# Patient Record
Sex: MALE | Race: WHITE | NOT HISPANIC OR LATINO | Employment: OTHER | URBAN - METROPOLITAN AREA
[De-identification: names, ages, dates, MRNs, and addresses within clinical notes are randomized per-mention and may not be internally consistent; named-entity substitution may affect disease eponyms.]

---

## 2017-04-05 ENCOUNTER — OFFICE VISIT (OUTPATIENT)
Dept: FAMILY MEDICINE | Facility: CLINIC | Age: 52
End: 2017-04-05

## 2017-04-05 VITALS
HEIGHT: 69 IN | SYSTOLIC BLOOD PRESSURE: 122 MMHG | DIASTOLIC BLOOD PRESSURE: 81 MMHG | BODY MASS INDEX: 22.08 KG/M2 | HEART RATE: 79 BPM | WEIGHT: 149.08 LBS | OXYGEN SATURATION: 100 %

## 2017-04-05 DIAGNOSIS — Z00.00 PREVENTATIVE HEALTH CARE: Primary | ICD-10-CM

## 2017-04-05 ASSESSMENT — PAIN SCALES - GENERAL: PAINLEVEL: NO PAIN (0)

## 2017-04-05 NOTE — NURSING NOTE
"51 year old  Chief Complaint   Patient presents with     Surgical Followup     Hernia repair about a year ago.     Skin Spots     to check on his abdomen.       Blood pressure 122/81, pulse 79, height 5' 9.49\" (176.5 cm), weight 149 lb 1.3 oz (67.6 kg), SpO2 100 %. Body mass index is 21.71 kg/(m^2).  Patient Active Problem List   Diagnosis     Basal cell carcinoma of skin     Preventative health care     Pap smear of anus with ASCUS     Bilateral recurrent inguinal hernia without obstruction or gangrene       Wt Readings from Last 2 Encounters:   04/05/17 149 lb 1.3 oz (67.6 kg)   06/20/16 155 lb (70.3 kg)     BP Readings from Last 3 Encounters:   04/05/17 122/81   06/22/16 110/73   06/20/16 126/82         Current Outpatient Prescriptions   Medication     COD LIVER OIL PO     Multiple Vitamin (DAILY MULTIVITAMIN PO)     VITAMIN E PO     No current facility-administered medications for this visit.        Social History   Substance Use Topics     Smoking status: Never Smoker     Smokeless tobacco: Never Used     Alcohol use Yes      Comment: rare social wine       There are no preventive care reminders to display for this patient.    No results found for: ARTEMIO Norman CMA  April 5, 2017 10:30 AM  "

## 2017-04-05 NOTE — MR AVS SNAPSHOT
"              After Visit Summary   4/5/2017    Jaun Reilly    MRN: [id number]           Patient Information     Date Of Birth          1965        Visit Information        Provider Department      4/5/2017 10:20 AM Oj Mathis MD [facility]         Follow-ups after your visit        Who to contact     Please call your clinic at [phone] to:    Ask questions about your health    Make or cancel appointments    Discuss your medicines    Learn about your test results    Speak to your doctor   If you have compliments or concerns about an experience at your clinic, or if you wish to file a complaint, please contact [facility] Patient Relations at [phone] or email us at [email or web address]         Additional Information About Your Visit        MyChart Information     [employer] gives you secure access to your electronic health record. If you see a primary care provider, you can also send messages to your care team and make appointments. If you have questions, please call your primary care clinic.  If you do not have a primary care provider, please call [phone] and they will assist you.      [employer] is an electronic gateway that provides easy, online access to your medical records. With [employer], you can request a clinic appointment, read your test results, renew a prescription or communicate with your care team.     To access your existing account, please contact your [facility] or call [phone] for assistance.        Care EveryWhere ID     This is your Care EveryWhere ID. This could be used by other organizations to access your [place] medical records  [id number]        Your Vitals Were     Pulse Height Pulse Oximetry BMI (Body Mass Index)          79 5' 9.49\" (176.5 cm) 100% 21.71 kg/m2         Blood Pressure from Last 3 Encounters:   04/05/17 122/81   06/22/16 110/73   06/20/16 126/82    Weight from Last 3 " Encounters:   04/05/17 149 lb 1.3 oz (67.6 kg)   06/20/16 155 lb (70.3 kg)   06/06/16 150 lb 8 oz (68.3 kg)              Today, you had the following     No orders found for display       Primary Care Provider Office Phone # Fax #    Oj Mathis [phone]       [place] [address]        Thank you!     [facility]. Our goal is always to provide you with excellent care. Hearing back from our patients is one way we can continue to improve our services. Please take a few minutes to complete the written survey that you may receive in the mail after your visit with us. Thank you!             Your Updated Medication List - Protect others around you: Learn how to safely use, store and throw away your medicines at www.disposemymeds.org.          This list is accurate as of: 4/5/17 10:56 AM.  Always use your most recent med list.                   Brand Name Dispense Instructions for use    COD LIVER OIL PO      Take 1 tablet by mouth daily.       DAILY MULTIVITAMIN PO      Take 1 tablet by mouth daily.       VITAMIN E PO      Take 1-2 tablets by mouth daily.

## 2017-07-14 ENCOUNTER — TRANSFERRED RECORDS (OUTPATIENT)
Dept: HEALTH INFORMATION MANAGEMENT | Facility: CLINIC | Age: 52
End: 2017-07-14

## 2018-04-16 ENCOUNTER — HOSPITAL ENCOUNTER (OUTPATIENT)
Facility: CLINIC | Age: 53
End: 2018-04-16
Attending: SPECIALIST | Admitting: SPECIALIST

## 2018-11-29 ENCOUNTER — OFFICE VISIT (OUTPATIENT)
Dept: OPHTHALMOLOGY | Facility: CLINIC | Age: 53
End: 2018-11-29
Attending: OPHTHALMOLOGY
Payer: COMMERCIAL

## 2018-11-29 DIAGNOSIS — H40.033 ANATOMICAL NARROW ANGLE BORDERLINE GLAUCOMA OF BOTH EYES: Primary | ICD-10-CM

## 2018-11-29 DIAGNOSIS — H53.10 SUBJECTIVE VISUAL DISTURBANCE: Primary | ICD-10-CM

## 2018-11-29 DIAGNOSIS — H53.10 SUBJECTIVE VISUAL DISTURBANCE: ICD-10-CM

## 2018-11-29 PROCEDURE — G0463 HOSPITAL OUTPT CLINIC VISIT: HCPCS | Mod: ZF

## 2018-11-29 PROCEDURE — 40000269 ZZH STATISTIC NO CHARGE FACILITY FEE: Mod: ZF

## 2018-11-29 PROCEDURE — 92083 EXTENDED VISUAL FIELD XM: CPT | Mod: ZF | Performed by: OPHTHALMOLOGY

## 2018-11-29 PROCEDURE — 92133 CPTRZD OPH DX IMG PST SGM ON: CPT | Mod: ZF | Performed by: OPHTHALMOLOGY

## 2018-11-29 PROCEDURE — 92020 GONIOSCOPY: CPT | Mod: ZF | Performed by: OPHTHALMOLOGY

## 2018-11-29 RX ORDER — BUPROPION HYDROCHLORIDE 150 MG/1
TABLET ORAL
COMMUNITY
Start: 2018-03-27 | End: 2020-10-21

## 2018-11-29 ASSESSMENT — SLIT LAMP EXAM - LIDS
COMMENTS: NORMAL

## 2018-11-29 ASSESSMENT — VISUAL ACUITY
OS_CC: 20/20
METHOD: SNELLEN - LINEAR
OS_CC: 20/20
CORRECTION_TYPE: GLASSES
CORRECTION_TYPE: GLASSES
OS_CC+: -2
OD_CC: 20/20
OS_CC+: -2
OD_CC: 20/20
METHOD: SNELLEN - LINEAR

## 2018-11-29 ASSESSMENT — REFRACTION_WEARINGRX
OD_AXIS: 133
OS_SPHERE: +3.50
OD_SPHERE: +2.25
OD_CYLINDER: +0.50
OS_CYLINDER: +0.75
OD_ADD: +2.00
OD_CYLINDER: +0.50
OS_SPHERE: +3.50
OS_CYLINDER: +0.75
OD_SPHERE: +2.25
OS_AXIS: 006
OS_ADD: +2.00
OS_ADD: +2.00
OS_AXIS: 006
OD_ADD: +2.00
OD_AXIS: 133

## 2018-11-29 ASSESSMENT — GONIOSCOPY
OS_INFERIOR: BARE TM
OS_TEMPORAL: APPOSITIONAL
OD_TEMPORAL: APPOSITIONAL
OD_INFERIOR: BARE TM
OS_SUPERIOR: APPOSITIONAL
OD_SUPERIOR: APPOSITIONAL
OD_NASAL: APPOSITIONAL
OS_NASAL: APPOSITIONAL

## 2018-11-29 ASSESSMENT — CUP TO DISC RATIO
OS_RATIO: 0.35
OD_RATIO: 0.35

## 2018-11-29 ASSESSMENT — TONOMETRY
IOP_METHOD: TONOPEN
OD_IOP_MMHG: 17
OS_IOP_MMHG: 18
OS_IOP_MMHG: 18
IOP_METHOD: ICARE
OD_IOP_MMHG: 17

## 2018-11-29 ASSESSMENT — EXTERNAL EXAM - LEFT EYE
OS_EXAM: NORMAL
OS_EXAM: NORMAL

## 2018-11-29 ASSESSMENT — EXTERNAL EXAM - RIGHT EYE
OD_EXAM: NORMAL
OD_EXAM: NORMAL

## 2018-11-29 ASSESSMENT — CONF VISUAL FIELD
OD_NORMAL: 1
OD_NORMAL: 1
OS_NORMAL: 1
OS_NORMAL: 1

## 2018-11-29 NOTE — LETTER
11/29/2018       RE: Jaun Reilly  [address]   [address]  [place] [facility] [address]     Dear Colleague,    Thank you for referring your patient, Jaun Reilly, to the EYE CLINIC at [facility]. Please see a copy of my visit note below.    Assessment & Plan      Jaun Reilly is a 53 year old male with the following diagnoses:   1. Anatomical narrow angle borderline glaucoma of both eyes         Reports episodes of foggy vision, halos, headache and nausea.  Likely intermittent angle closure  270 degrees of appositional closure in both eyes.  Unable to open with dynamic gonio  Intraocular pressure within normal limits today both eyes and optic nerves appear healthy    Discussed risk of acute angle closure glaucoma and recommend laser peripheral iridotomy (LPI) both eyes   RBA to procedure reviewed, he will return next week for treatment  Precautions reviewed  Will obtain baseline pachmetry and dilated fundus exam following lasers        Patient disposition:   Return in about 1 week (around 12/6/2018) for VT only, RIGHT EYE YAG PI.          Attending Physician Attestation:  Complete documentation of historical and exam elements from today's encounter can be found in the full encounter summary report (not reduplicated in this progress note).  I personally obtained the chief complaint(s) and history of present illness.  I confirmed and edited as necessary the review of systems, past medical/surgical history, family history, social history, and examination findings as documented by others; and I examined the patient myself.  I personally reviewed the relevant tests, images, and reports as documented above.  I formulated and edited as necessary the assessment and plan and discussed the findings and management plan with the patient and family. . - Job Pinedo MD       Again, thank you for allowing me to participate in the care of your patient.      Sincerely,    Job  Colby Pinedo MD

## 2018-11-29 NOTE — NURSING NOTE
"Chief Complaints and History of Present Illnesses   Patient presents with     Consult For     POAG     HPI    Last Eye Exam:  11/29/18   Symptoms:              Comments:  Return for laser iridotomy with Dr. Pinedo .   Jaun Reilly is a 53 year old male who presents today for   1. Concussion, with loss of consciousness of unspecified duration, initial encounter   2. Subjective visual disturbance     Cloudy vision. Onset 5 years ago. Short lived. Film across eyes, describes as \"smoke effect\". Used to disappear in half an hour, but now last two hours. Happens once every 4 months. Typically followed by a headache. Feels dizzy.     Susan JASMINE 8:52 AM November 29, 2018   Vadim Sanderson  10:48 AM November 29, 2018                "
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
## 2018-11-29 NOTE — PROGRESS NOTES
Assessment & Plan      Jaun Reilly is a 53 year old male with the following diagnoses:   1. Anatomical narrow angle borderline glaucoma of both eyes         Reports episodes of foggy vision, halos, headache and nausea.  Likely intermittent angle closure  270 degrees of appositional closure in both eyes.  Unable to open with dynamic gonio  Intraocular pressure within normal limits today both eyes and optic nerves appear healthy    Discussed risk of acute angle closure glaucoma and recommend laser peripheral iridotomy (LPI) both eyes   RBA to procedure reviewed, he will return next week for treatment  Precautions reviewed  Will obtain baseline pachmetry and dilated fundus exam following lasers        Patient disposition:   Return in about 1 week (around 12/6/2018) for VT only, RIGHT EYE YAG PI.          Attending Physician Attestation:  Complete documentation of historical and exam elements from today's encounter can be found in the full encounter summary report (not reduplicated in this progress note).  I personally obtained the chief complaint(s) and history of present illness.  I confirmed and edited as necessary the review of systems, past medical/surgical history, family history, social history, and examination findings as documented by others; and I examined the patient myself.  I personally reviewed the relevant tests, images, and reports as documented above.  I formulated and edited as necessary the assessment and plan and discussed the findings and management plan with the patient and family. . - Job Pinedo MD

## 2018-11-29 NOTE — MR AVS SNAPSHOT
After Visit Summary   11/29/2018    Jaun Reilly    MRN: [id number]           Patient Information     Date Of Birth          1965        Visit Information        Provider Department      11/29/2018 10:45 AM Job Pinedo MD Eye Clinic        Today's Diagnoses     Anatomical narrow angle borderline glaucoma of both eyes    -  1      Care Instructions    You have an eye condition called anatomical narrow angles. This condition can cause some people to have a sudden attack of glaucoma which may cause decreased vision, halos around lights, eye or brow area pain, nausea and vomiting. This sudden attack of glaucoma happens very rarely, but may cause irreversible vision loss. It is important to see an ophthalmologist urgently should you develop any of the above symptoms for possible medical or laser treatment in office.  We do offer 24/7 emergency eye care and you may call at any time. Please avoid all over the counter allergy and cold medications (i.e., benadryl) as these can incite a sudden attack of glaucoma.              Follow-ups after your visit        Follow-up notes from your care team     Return in about 1 week (around 12/6/2018) for VT only, RIGHT EYE YAG PI.      Your next 10 appointments already scheduled     Dec 05, 2018  3:30 PM CST   Procedure with Job Pinedo MD   Eye Clinic ([facility])    [address] [address] [phone]              Who to contact     Please call your clinic at [phone] to:    Ask questions about your health    Make or cancel appointments    Discuss your medicines    Learn about your test results    Speak to your doctor            Additional Information About Your Visit        MyChart Information     [employer] gives you secure access to your electronic health record. If you see a primary care provider, you can also send messages to your care team and make  appointments. If you have questions, please call your primary care clinic.  If you do not have a primary care provider, please call [phone] and they will assist you.      [employer] is an electronic gateway that provides easy, online access to your medical records. With [employer], you can request a clinic appointment, read your test results, renew a prescription or communicate with your care team.     To access your existing account, please contact your [facility] or call [phone] for assistance.        Care EveryWhere ID     This is your Care EveryWhere ID. This could be used by other organizations to access your [place] medical records  [id number]         Blood Pressure from Last 3 Encounters:   04/05/17 122/81   06/22/16 110/73   06/20/16 126/82    Weight from Last 3 Encounters:   04/05/17 67.6 kg (149 lb 1.3 oz)   06/20/16 70.3 kg (155 lb)   06/06/16 68.3 kg (150 lb 8 oz)              Today, you had the following     No orders found for display       Primary Care Provider Office Phone # Fax #    Oj Mathis [phone]       [address] [place] [address]        Equal Access to Services     CHEMA ZARAGOZA : Hadii rosa m segoviao Sobenson, waaxda luqadaha, qaybta kaalmada adeegyada, honorio morrell. So [facility] [phone].    ATENCIÓN: Si habla español, tiene a ramirez disposición servicios gratuitos de asistencia lingüística. Llame al [phone].    We comply with applicable federal civil rights laws and [place] laws. We do not discriminate on the basis of race, color, national origin, age, disability, sex, sexual orientation, or gender identity.            Thank you!     Thank you for choosing EYE CLINIC  for your care. Our goal is always to provide you with excellent care. Hearing back from our patients is one way we can continue to improve our services. Please take a few minutes to complete the written survey that you  may receive in the mail after your visit with us. Thank you!             Your Updated Medication List - Protect others around you: Learn how to safely use, store and throw away your medicines at www.disposemymeds.org.          This list is accurate as of 11/29/18 11:15 AM.  Always use your most recent med list.                   Brand Name Dispense Instructions for use Diagnosis    buPROPion 150 MG 24 hr tablet    WELLBUTRIN XL     take 1 tablet by mouth daily for 1 week, then take 2 tablets by mouth daily        COD LIVER OIL PO      Take 1 tablet by mouth daily.        DAILY MULTIVITAMIN PO      Take 1 tablet by mouth daily.        SELENIUM PO      Take 1 capsule by mouth        VITAMIN D-3 PO           VITAMIN E PO      Take 1-2 tablets by mouth daily.

## 2018-11-29 NOTE — NURSING NOTE
"Chief Complaints and History of Present Illnesses   Patient presents with     Neurologic Problem     F/U TBI     HPI    Symptoms:              Comments:  Jaun Reilly is a 53 year old male who presents today for  1. Concussion, with loss of consciousness of unspecified duration, initial encounter   2. Subjective visual disturbance     Cloudy vision. Onset 5 years ago. Short lived. Film across eyes, describes as \"smoke effect\". Disappeared in half an hour. Happens once every 4 months. Typically followed by a headache. Feels dizzy.     Susan JASMINE 8:52 AM November 29, 2018                  "

## 2018-11-29 NOTE — MR AVS SNAPSHOT
After Visit Summary   11/29/2018    Jaun Reilly    MRN: [id number]           Patient Information     Date Of Birth          1965        Visit Information        Provider Department      11/29/2018 8:30 AM Agustín Pena MD Eye Clinic        Today's Diagnoses     Anatomical narrow angle borderline glaucoma of both eyes    -  1    Subjective visual disturbance           Follow-ups after your visit        Follow-up notes from your care team     Return for laser iridotomy with Dr. Pinedo .      Your next 10 appointments already scheduled     Dec 05, 2018  3:30 PM CST   Procedure with Job Pinedo MD   Eye Clinic ([facility])    [address]  9[address] Clin [address]  [facility] [address]   [phone]              Who to contact     Please call your clinic at [phone] to:    Ask questions about your health    Make or cancel appointments    Discuss your medicines    Learn about your test results    Speak to your doctor            Additional Information About Your Visit        [employer] Information     [employer] gives you secure access to your electronic health record. If you see a primary care provider, you can also send messages to your care team and make appointments. If you have questions, please call your primary care clinic.  If you do not have a primary care provider, please call [phone] and they will assist you.      [employer] is an electronic gateway that provides easy, online access to your medical records. With [employer], you can request a clinic appointment, read your test results, renew a prescription or communicate with your care team.     To access your existing account, please contact your [facility] or call [phone] for assistance.        Care EveryWhere ID     This is your Care EveryWhere ID. This could be used by other organizations to access your [place] medical records  [id number]          Blood Pressure from Last 3 Encounters:   04/05/17 122/81   06/22/16 110/73   06/20/16 126/82    Weight from Last 3 Encounters:   04/05/17 67.6 kg (149 lb 1.3 oz)   06/20/16 70.3 kg (155 lb)   06/06/16 68.3 kg (150 lb 8 oz)              We Performed the Following     Glaucoma Top OU     IOP Measurement     OCT Optic Nerve RNFL Spectralis OU (both eyes)        Primary Care Provider Office Phone # Fax #    Oj Mathis [phone] [address] [place] [address]        Equal Access to Services     [facility]: Hadii rosa m Kellogg, waaxda mary, qaybta kaalmada latesha, honorio garcia . So [facility] [phone].    ATENCIÓN: Si habla español, tiene a ramirez disposición servicios gratuitos de asistencia lingüística. Ll[facility] [phone].    We comply with applicable federal civil rights laws and [place] laws. We do not discriminate on the basis of race, color, national origin, age, disability, sex, sexual orientation, or gender identity.            Thank you!     Thank you for choosing EYE CLINIC  for your care. Our goal is always to provide you with excellent care. Hearing back from our patients is one way we can continue to improve our services. Please take a few minutes to complete the written survey that you may receive in the mail after your visit with us. Thank you!             Your Updated Medication List - Protect others around you: Learn how to safely use, store and throw away your medicines at www.disposemymeds.org.          This list is accurate as of 11/29/18 11:20 AM.  Always use your most recent med list.                   Brand Name Dispense Instructions for use Diagnosis    buPROPion 150 MG 24 hr tablet    WELLBUTRIN XL     take 1 tablet by mouth daily for 1 week, then take 2 tablets by mouth daily        COD LIVER OIL PO      Take 1 tablet by mouth daily.        DAILY MULTIVITAMIN PO      Take 1 tablet by mouth daily.         SELENIUM PO      Take 1 capsule by mouth        VITAMIN D-3 PO           VITAMIN E PO      Take 1-2 tablets by mouth daily.

## 2018-11-29 NOTE — PATIENT INSTRUCTIONS
You have an eye condition called anatomical narrow angles. This condition can cause some people to have a sudden attack of glaucoma which may cause decreased vision, halos around lights, eye or brow area pain, nausea and vomiting. This sudden attack of glaucoma happens very rarely, but may cause irreversible vision loss. It is important to see an ophthalmologist urgently should you develop any of the above symptoms for possible medical or laser treatment in office.  We do offer 24/7 emergency eye care and you may call at any time. Please avoid all over the counter allergy and cold medications (i.e., benadryl) as these can incite a sudden attack of glaucoma.

## 2018-12-05 ENCOUNTER — OFFICE VISIT (OUTPATIENT)
Dept: OPHTHALMOLOGY | Facility: CLINIC | Age: 53
End: 2018-12-05
Attending: OPHTHALMOLOGY
Payer: COMMERCIAL

## 2018-12-05 DIAGNOSIS — H40.033 ANATOMICAL NARROW ANGLE BORDERLINE GLAUCOMA OF BOTH EYES: Primary | ICD-10-CM

## 2018-12-05 PROCEDURE — 66761 REVISION OF IRIS: CPT | Mod: ZF | Performed by: OPHTHALMOLOGY

## 2018-12-05 ASSESSMENT — REFRACTION_WEARINGRX
OD_CYLINDER: +0.50
OS_ADD: +2.00
OS_AXIS: 006
OD_ADD: +2.00
OS_CYLINDER: +0.75
OS_SPHERE: +3.50
OD_AXIS: 133
OD_SPHERE: +2.25

## 2018-12-05 ASSESSMENT — VISUAL ACUITY
OD_CC: 20/20
CORRECTION_TYPE: GLASSES
OD_CC+: -1
METHOD: SNELLEN - LINEAR
OS_CC: 20/25

## 2018-12-05 ASSESSMENT — EXTERNAL EXAM - RIGHT EYE: OD_EXAM: NORMAL

## 2018-12-05 ASSESSMENT — SLIT LAMP EXAM - LIDS
COMMENTS: NORMAL
COMMENTS: NORMAL

## 2018-12-05 ASSESSMENT — TONOMETRY
OD_IOP_MMHG: 19
OD_IOP_MMHG: 26
OS_IOP_MMHG: 19
OS_IOP_MMHG: 33
IOP_METHOD: TONOPEN
OD_IOP_MMHG: 24
OS_IOP_MMHG: 25
IOP_METHOD: APPLANATION
IOP_METHOD: APPLANATION

## 2018-12-05 ASSESSMENT — CONF VISUAL FIELD
OD_NORMAL: 1
OS_NORMAL: 1

## 2018-12-05 ASSESSMENT — EXTERNAL EXAM - LEFT EYE: OS_EXAM: NORMAL

## 2018-12-05 NOTE — MR AVS SNAPSHOT
After Visit Summary   12/5/2018    Jaun Reilly    MRN: [id number]           Patient Information     Date Of Birth          1965        Visit Information        Provider Department      12/5/2018 2:15 PM Job Pinedo MD [facility]         Follow-ups after your visit        Your next 10 appointments already scheduled     Dec 13, 2018  3:00 PM CST   Procedure with Job Pinedo MD   Eye Clinic ([facility])    [address]  [facility] [address]   [phone]              Who to contact     Please call your clinic at [phone] to:    Ask questions about your health    Make or cancel appointments    Discuss your medicines    Learn about your test results    Speak to your doctor            Additional Information About Your Visit        MyChart Information     Zula gives you secure access to your electronic health record. If you see a primary care provider, you can also send messages to your care team and make appointments. If you have questions, please call your primary care clinic.  If you do not have a primary care provider, please call [phone] and they will assist you.      Zula is an electronic gateway that provides easy, online access to your medical records. With Zula, you can request a clinic appointment, read your test results, renew a prescription or communicate with your care team.     To access your existing account, please contact your [facility] or call [phone] for assistance.        Care EveryWhere ID     This is your Care EveryWhere ID. This could be used by other organizations to access your [facility] medical records  [id number]         Blood Pressure from Last 3 Encounters:   04/05/17 122/81   06/22/16 110/73   06/20/16 126/82    Weight from Last 3 Encounters:   04/05/17 67.6 kg (149 lb 1.3 oz)   06/20/16 70.3 kg (155 lb)   06/06/16 68.3 kg (150 lb 8  oz)              Today, you had the following     No orders found for display       Primary Care Provider Office Phone # Fax #    Oj Mathis [phone]       [address] [place] [address]        Equal Access to Services     CHEMA ZARAGOZA : Hadsoto edmond julietteo Soomaali, waaxda luqadaha, qaybta kaalmada adekikayada, honorio zohrain hayaasandro malloykika gamboa jose . So [facility] [phone].    ATENCIÓN: Si habla español, tiene a ramirez disposición servicios gratuitos de asistencia lingüística. Llame al [phone].    We comply with applicable federal civil rights laws and [place] laws. We do not discriminate on the basis of race, color, national origin, age, disability, sex, sexual orientation, or gender identity.            Thank you!     Thank you for choosing EYE CLINIC  for your care. Our goal is always to provide you with excellent care. Hearing back from our patients is one way we can continue to improve our services. Please take a few minutes to complete the written survey that you may receive in the mail after your visit with us. Thank you!             Your Updated Medication List - Protect others around you: Learn how to safely use, store and throw away your medicines at www.disposemymeds.org.          This list is accurate as of 12/5/18  4:06 PM.  Always use your most recent med list.                   Brand Name Dispense Instructions for use Diagnosis    buPROPion 150 MG 24 hr tablet    WELLBUTRIN XL     take 1 tablet by mouth daily for 1 week, then take 2 tablets by mouth daily        COD LIVER OIL PO      Take 1 tablet by mouth daily.        DAILY MULTIVITAMIN PO      Take 1 tablet by mouth daily.        SELENIUM PO      Take 1 capsule by mouth        VITAMIN D-3 PO           VITAMIN E PO      Take 1-2 tablets by mouth daily.

## 2018-12-05 NOTE — NURSING NOTE
"Chief Complaints and History of Present Illnesses   Patient presents with     Follow Up For     narrow Angles      HPI    Last Eye Exam:  11/29/18   Affected eye(s):  Both   Symptoms:           Do you have eye pain now?:  Yes   Location:  OU   Unknown:  Duration unknown   Other:  \" Like pressure or eye strained, has been there for long time \"    Pain Frequency:  Constant      Comments:  Pt here for Yag LPI RE. Vision is stable. Feels that eyes have a lot of pressure behind them. CHAD MOSS, COA 2:20 PM 12/05/2018                "

## 2018-12-06 NOTE — PROGRESS NOTES
Assessment & Plan      Jaun Reilly is a 53 year old male with the following diagnoses:   1. Anatomical narrow angle borderline glaucoma of both eyes       Returns for laser peripheral iridotomy (LPI) today   RBA reviewed, will proceed with left eye today.  Consent obtained    Patient disposition:   Return for VT only; RIGHT LPI.          Attending Physician Attestation:  Complete documentation of historical and exam elements from today's encounter can be found in the full encounter summary report (not reduplicated in this progress note).  I personally obtained the chief complaint(s) and history of present illness.  I confirmed and edited as necessary the review of systems, past medical/surgical history, family history, social history, and examination findings as documented by others; and I examined the patient myself.  I personally reviewed the relevant tests, images, and reports as documented above.  I formulated and edited as necessary the assessment and plan and discussed the findings and management plan with the patient and family. . - Job Pinedo MD

## 2018-12-11 ENCOUNTER — TELEPHONE (OUTPATIENT)
Dept: OPHTHALMOLOGY | Facility: CLINIC | Age: 53
End: 2018-12-11

## 2018-12-11 NOTE — TELEPHONE ENCOUNTER
[facility] Call Center    Phone Message    May a detailed message be left on voicemail: yes    Reason for Call: Other: PT has a procedure with Dr. Pinedo on Thursday that they would like to cancel. Right not pt doesn't know if they would like to reschedule.      Action Taken: Message routed to:  [facility]

## 2018-12-13 NOTE — TELEPHONE ENCOUNTER
[facility] Call [facility]    Phone Message    May a detailed message be left on voicemail: yes    Reason for Call: Other: Pt is wanting to let Dr. Pinedo that he will call back to reschedule his right eye, when he feels more ready to have the procedure again. He will call back to schedule that procedure in about a week or so per pt. Thank you     Action Taken: Message routed to:  [facility]

## 2018-12-14 ENCOUNTER — TELEPHONE (OUTPATIENT)
Dept: OPHTHALMOLOGY | Facility: CLINIC | Age: 53
End: 2018-12-14

## 2018-12-14 NOTE — TELEPHONE ENCOUNTER
Dr. Pinedo recommended sooner appt for laser peripheral iridotomy for narrow angles    Pt has had symptoms after first eye done    When looks at light has pain since procedure    No medicated eye drops prescribed    Scheduled next Thursday (pt wanting to wait til Thursday) with dr. Brian Pena    Stated Dr. Pinedo WOULD be able perform per pt request and scheduled that way for our system-- avoid overbooks  facilitator may change over to dr. Pinedo schedule if applicable    Will forward to dr. Pinedo for review also    Jluis Ken RN 11:46 AM 12/14/18

## 2018-12-14 NOTE — TELEPHONE ENCOUNTER
Called pt to offer appt with Dr. Pinedo on Tuesday, 12/18/18, per Dr. Pinedo's recommendation - pt refused Tuesday appt and wanted to keep the previously scheduled appt for Thursday, 12/20/18, for LPI procedure.    Jenn Venegas COA 4:17 PM December 14, 2018

## 2018-12-15 ENCOUNTER — TELEPHONE (OUTPATIENT)
Dept: OPHTHALMOLOGY | Facility: CLINIC | Age: 53
End: 2018-12-15

## 2018-12-15 NOTE — TELEPHONE ENCOUNTER
"Talked to pt today, he is upset that we keep calling, I did tell him that we were concerned and thought he should maybe come in on Tuesday so we can see what is going on and being Dr. Pinedo's assistant I wanted to make sure he was happy with the day and time. He is not having pain according to our first conversation that we had but cloudiness and dizziness that he says has been going on for about 6 years now. He is a little light sensitive and sees a halo when looking at the light which can cause some pain, per our second part of the conversation. He was happy the first 3 days and now is concerned about doing the other eye because of us \"not knowing what is really going on, and why should he poke another hole if that isn't going to solve his issue\" I did tell pt to use AFT more often to see if that will make the eye feel better. He stated that he uses them at night and in the morning sometimes.   Pt is happy with Thursday and doesn't feel it is an emergency.   Vadim Givens  9:43 AM December 15, 2018     "

## 2018-12-20 ENCOUNTER — OFFICE VISIT (OUTPATIENT)
Dept: OPHTHALMOLOGY | Facility: CLINIC | Age: 53
End: 2018-12-20
Attending: OPHTHALMOLOGY
Payer: COMMERCIAL

## 2018-12-20 DIAGNOSIS — H40.033 ANATOMICAL NARROW ANGLE BORDERLINE GLAUCOMA OF BOTH EYES: Primary | ICD-10-CM

## 2018-12-20 PROCEDURE — 76513 OPH US DX ANT SGM US UNI/BI: CPT | Mod: ZF | Performed by: OPHTHALMOLOGY

## 2018-12-20 PROCEDURE — G0463 HOSPITAL OUTPT CLINIC VISIT: HCPCS | Mod: 25

## 2018-12-20 PROCEDURE — 66761 REVISION OF IRIS: CPT | Mod: ZF | Performed by: OPHTHALMOLOGY

## 2018-12-20 RX ORDER — LATANOPROST 50 UG/ML
1 SOLUTION/ DROPS OPHTHALMIC AT BEDTIME
Qty: 1 BOTTLE | Refills: 11 | Status: SHIPPED | OUTPATIENT
Start: 2018-12-20 | End: 2019-08-08

## 2018-12-20 RX ORDER — DORZOLAMIDE HYDROCHLORIDE AND TIMOLOL MALEATE 20; 5 MG/ML; MG/ML
1 SOLUTION/ DROPS OPHTHALMIC 2 TIMES DAILY
Qty: 1 BOTTLE | Refills: 11 | Status: SHIPPED | OUTPATIENT
Start: 2018-12-20 | End: 2018-12-20

## 2018-12-20 RX ORDER — DORZOLAMIDE HYDROCHLORIDE AND TIMOLOL MALEATE 20; 5 MG/ML; MG/ML
1 SOLUTION/ DROPS OPHTHALMIC 2 TIMES DAILY
Qty: 1 BOTTLE | Refills: 11 | Status: SHIPPED | OUTPATIENT
Start: 2018-12-20 | End: 2019-08-08

## 2018-12-20 RX ORDER — LATANOPROST 50 UG/ML
1 SOLUTION/ DROPS OPHTHALMIC AT BEDTIME
Qty: 1 BOTTLE | Refills: 11 | Status: SHIPPED | OUTPATIENT
Start: 2018-12-20 | End: 2018-12-20

## 2018-12-20 ASSESSMENT — EXTERNAL EXAM - LEFT EYE: OS_EXAM: NORMAL

## 2018-12-20 ASSESSMENT — REFRACTION_WEARINGRX
OS_SPHERE: +3.50
OD_AXIS: 133
OD_CYLINDER: +0.50
OS_ADD: +2.00
OS_AXIS: 006
OD_SPHERE: +2.25
OS_CYLINDER: +0.75
OD_ADD: +2.00

## 2018-12-20 ASSESSMENT — TONOMETRY
OD_IOP_MMHG: 16
OD_IOP_MMHG: 42
IOP_METHOD: APPLANATION
OS_IOP_MMHG: 12
OS_IOP_MMHG: 31

## 2018-12-20 ASSESSMENT — SLIT LAMP EXAM - LIDS
COMMENTS: NORMAL
COMMENTS: NORMAL

## 2018-12-20 ASSESSMENT — EXTERNAL EXAM - RIGHT EYE: OD_EXAM: NORMAL

## 2018-12-20 ASSESSMENT — VISUAL ACUITY
METHOD: SNELLEN - LINEAR
CORRECTION_TYPE: GLASSES
OD_CC: 20/20
OS_CC: 20/25

## 2018-12-20 ASSESSMENT — CONF VISUAL FIELD
OD_NORMAL: 1
OS_NORMAL: 1

## 2018-12-20 NOTE — PROGRESS NOTES
Chief Complaint(s) and History of Present Illness(es)     laser peripheral iridotomy (LPI)  Pt. States that he did have some headaches and halos following laser LE.  Did have another episode of headache and halos last night.  Worried about having RE done today due to symptoms.   Shelby Muro COT 3:19 PM December 20, 2018         Review of systems for the eyes was negative other than the pertinent positives/negatives listed in the HPI.      Assessment & Plan      Jaun Reilly is a 53 year old male with the following diagnoses:   1. Anatomical narrow angle borderline glaucoma of both eyes         S/P Laser peripheral iridotomy (LPI) left eye 12/6/18  Initially with some pain and halos in the left eye.  Felt good for the past four days, but now his symptoms are worsening again  Visual acuity seemed stable in both eyes     Acute intraocular pressure rise in both eyes today   Laser peripheral iridotomy (LPI) looks patent left eye   Repeat gonio: closed right eye; limited TM visible inferiorly left eye   Both eyes: 4 total rounds of timolol, brimonidine and dorzolamide   Intraocular pressure reduced to 16 and 12 mm Hg in the right eye and left eye respectively    Long discussion of RBA to laser peripheral iridotomy.  Recommend additional laser left eye today  Return tomorrow for right eye   Start latanoprost at bedtime both eyes today   Start Cosopt twice a day both eyes today         Patient disposition:   Return in about 1 day (around 12/21/2018) for VT only, LPI RIGHT EYE.          Attending Physician Attestation:  Complete documentation of historical and exam elements from today's encounter can be found in the full encounter summary report (not reduplicated in this progress note).  I personally obtained the chief complaint(s) and history of present illness.  I confirmed and edited as necessary the review of systems, past medical/surgical history, family history, social history, and examination findings as  documented by others; and I examined the patient myself.  I personally reviewed the relevant tests, images, and reports as documented above.  I formulated and edited as necessary the assessment and plan and discussed the findings and management plan with the patient and family. . - Job Pinedo MD

## 2018-12-21 ENCOUNTER — TELEPHONE (OUTPATIENT)
Dept: OPHTHALMOLOGY | Facility: CLINIC | Age: 53
End: 2018-12-21

## 2018-12-21 ENCOUNTER — OFFICE VISIT (OUTPATIENT)
Dept: OPHTHALMOLOGY | Facility: CLINIC | Age: 53
End: 2018-12-21
Attending: OPHTHALMOLOGY
Payer: COMMERCIAL

## 2018-12-21 DIAGNOSIS — H40.033 ANATOMICAL NARROW ANGLE BORDERLINE GLAUCOMA OF BOTH EYES: Primary | ICD-10-CM

## 2018-12-21 PROCEDURE — 40000809 ZZH STATISTIC NO DOCUMENTATION TO SUPPORT CHARGE

## 2018-12-21 PROCEDURE — 66761 REVISION OF IRIS: CPT | Mod: ZF | Performed by: OPHTHALMOLOGY

## 2018-12-21 PROCEDURE — G0463 HOSPITAL OUTPT CLINIC VISIT: HCPCS | Mod: ZF

## 2018-12-21 ASSESSMENT — CONF VISUAL FIELD
OD_NORMAL: 1
OS_NORMAL: 1

## 2018-12-21 ASSESSMENT — TONOMETRY
OD_IOP_MMHG: 12
IOP_METHOD: APPLANATION
OS_IOP_MMHG: 9
IOP_METHOD: TONOPEN
OD_IOP_MMHG: 10

## 2018-12-21 ASSESSMENT — EXTERNAL EXAM - RIGHT EYE: OD_EXAM: NORMAL

## 2018-12-21 ASSESSMENT — SLIT LAMP EXAM - LIDS
COMMENTS: NORMAL
COMMENTS: NORMAL

## 2018-12-21 ASSESSMENT — VISUAL ACUITY
OD_CC: 20/20
OD_CC+: -2
METHOD: SNELLEN - LINEAR
OS_CC: 20/20

## 2018-12-21 ASSESSMENT — REFRACTION_WEARINGRX
OD_CYLINDER: +0.50
OD_ADD: +2.00
OS_SPHERE: +3.50
OS_CYLINDER: +0.75
OD_SPHERE: +2.25
OD_AXIS: 133
OS_AXIS: 006
OS_ADD: +2.00

## 2018-12-21 ASSESSMENT — EXTERNAL EXAM - LEFT EYE: OS_EXAM: NORMAL

## 2018-12-21 NOTE — NURSING NOTE
"No chief complaint on file.    Chief Complaint(s) and History of Present Illness(es)     Patient here for Laser Peripheral Iridotomy of the right eye due to Anatomical narrow angle borderline glaucoma of both eyes. Had left PI yesterday. His vision feels \"fish bowl\" like and his left eye feels \"tight\". Has mild dizziness today. Used Latanoprost last night at about 11:30pm. Used Cosopt this morning at 7am.   Sandra JEREZ 12:04 PM December 21, 2018             "

## 2018-12-21 NOTE — TELEPHONE ENCOUNTER
Pt missed latanoprost last night    Reviewed ok to hold/not use this AM and continue tonight as prescribed    Ok to use artificial tears between medicated eye drops    Pt verbally demonstrated understanding and will be at clinic at noon today  Jluis Ken RN 8:06 AM 12/21/18

## 2018-12-21 NOTE — TELEPHONE ENCOUNTER
No answer this Friday afternoon    Note to dr. Pinedo to review if has any preferences for optical shops for glasses per pt request  Jluis Ken RN 5:15 PM 12/21/18

## 2018-12-21 NOTE — TELEPHONE ENCOUNTER
[facility] Call Center    Phone Message    May a detailed message be left on voicemail: yes    Reason for Call: Other: Pt requests a referral to an optometrist to make his new glasses.  Please contact Pt with recommendation.     Action Taken: Message routed to:  [facility] clinic

## 2018-12-21 NOTE — PROGRESS NOTES
"Chief Complaint(s) and History of Present Illness(es)     Patient here for Laser Peripheral Iridotomy of the right eye due to   Anatomical narrow angle borderline glaucoma of both eyes. Had left PI   yesterday. His vision feels \"fish bowl\" like and his left eye feels   \"tight\". Has mild dizziness today. Used Latanoprost last night at about   11:30pm. Used Cosopt this morning at 7am.   Sandra JEREZ 12:04 PM December 21, 2018         Review of systems for the eyes was negative other than the pertinent positives/negatives listed in the HPI.        Assessment & Plan      Jaun Reilly is a 53 year old male with the following diagnoses:   1. Anatomical narrow angle borderline glaucoma of both eyes         S/P Laser peripheral iridotomy (LPI) left eye 12/6/18 and 12/21/18 (both patent)  Started on Cosopt and latanoprost yesterday after presenting in acute closure (hasn't started latanoprost yet)  Intraocular pressure greatly improved today in both eyes   RBA to laser peripheral iridotomy right eye today.    Cont latanoprost at bedtime both eyes   Cont Cosopt twice a day both eyes        Patient disposition:   Return in about 6 weeks (around 2/1/2019) for VT only, ant seg OCT, OCT NFL.          Attending Physician Attestation:  Complete documentation of historical and exam elements from today's encounter can be found in the full encounter summary report (not reduplicated in this progress note).  I personally obtained the chief complaint(s) and history of present illness.  I confirmed and edited as necessary the review of systems, past medical/surgical history, family history, social history, and examination findings as documented by others; and I examined the patient myself.  I personally reviewed the relevant tests, images, and reports as documented above.  I formulated and edited as necessary the assessment and plan and discussed the findings and management plan with the patient and family. . - Job Pinedo, " MD

## 2018-12-21 NOTE — TELEPHONE ENCOUNTER
[facility] Call Center    Phone Message    May a detailed message be left on voicemail: yes    Reason for Call: Other: Pt states that he missed his bed time dose for the Rx Latanoprost last night. Pt has a procedure at noon today to have his right eye lasered. Pt wants to know if he should take the eye drop this morning? Or if it doesn't really matter much? Please f/u with pt asap to advise.     Action Taken: Message routed to:  [facility]

## 2018-12-26 ENCOUNTER — NURSE TRIAGE (OUTPATIENT)
Dept: INTERNAL MEDICINE | Facility: CLINIC | Age: 53
End: 2018-12-26

## 2018-12-26 NOTE — TELEPHONE ENCOUNTER
"[facility]: Nurse Triage Note  SITUATION/BACKGROUND                                                      Jaun Reilly is a 53 year old male who calls with queries regarding expectations of care and healing.   [facility] EYE:  Anatomical narrow angle borderline glaucoma of both eyes   Laser peripheral iridotomy (LPI) left eye 12/6/18   Plan of care per Dr Pinedo on 12/21/2019:  Cont latanoprost at bedtime both eyes   Cont Cosopt twice a day both eyes  RBA to laser peripheral iridotomy right eye today.  Return in about 6 weeks (around 2/1/2019) for VT only, ant seg OCT, OCT NFL       Question #1: RIGHT eye photophobia now- \"refridgerator light or sudden light,feels pulling on spot just like the left one did  immediately following procedure. \"Is this the usual and common  expectation post-laser iridotomy?  Question #2 is  are we confident that pressure readings will respond to current drops and that no recheck of pressures will be required before 6 week appt Dr Pinedo?  Pressure is increased in head- behind eyes, in head.  He thinks this is normal but would like reassurrance and what to look out for.    Question #3: He asked about purpose of Latanoprost and Cosopt>   Reviewed that these medications are used treat high pressure inside the eye due to glaucoma and to reduce IOPand that Latanoprost can increase sensitivity to light.  Pressure is increased in head- behind eyes, in head.   12/21/2018 IOP=43 in R, 30 in L  MEDICATIONS:   Taking medication(s) as prescribed? Yes      Allergies:   Allergies   Allergen Reactions     No Known Allergies      Seasonal Allergies        ASSESSMENT      Patient with questions regarding management of symptoms / IOP.  Anatomical narrow angle borderline glaucoma of both eyes   Laser peripheral iridotomy (LPI) left eye 12/6/18  Reassured we would  Review and get him appropriate information.    RECOMMENDATION/PLAN                                                  "     RECOMMENDED DISPOSITION:  Phone Visit  Will comply with recommendation: Yes    If further questions/concerns or if symptoms do not improve, worsen or new symptoms develop, call your PCP or [phone] to talk with the Resident on call, as soon as possible.    Guideline used: pp. 635 vision  Telephone Triage Protocols for Nurses, Fifth Edition, Ashlie Alonzo RN

## 2018-12-26 NOTE — TELEPHONE ENCOUNTER
Pt states having some light sensitivities post laser peripheral iridotomy   Feels like pulling    Pt concerned of high pressures in eyes and wondering if ok to have high pressures    Reviewed able to evaluate today/tomorrow the light sensitivities and eye pressure post-procedure    Reviewed dr. Pinedo not in today, able to schedule with on call eye provider    --  I reviewed with pt that I reviewed eye drops prescriptions with dr. Pinedo following opposite eye symptoms of light sensitivities and Dr. Pinedo recommended evaluation    Pt states would like to see dr. Pinedo only and will monitor symptoms overnight-- in not improving will call for appt tomorrow    Note to dr. Pinedo/on call eye provider for review  Jluis Ken RN 2:13 PM 12/26/18

## 2018-12-28 ENCOUNTER — TELEPHONE (OUTPATIENT)
Dept: OPHTHALMOLOGY | Facility: CLINIC | Age: 53
End: 2018-12-28

## 2018-12-28 NOTE — TELEPHONE ENCOUNTER
[facility] Call Center    Phone Message    May a detailed message be left on voicemail: yes    Reason for Call: Other: per pt- wants to know if taking systane will interfere with latanoprost (XALATAN) 0.005 % ophthalmic solution and dorzolamide-timolol (COSOPT) 2-0.5 % ophthalmic solution- pt wants to know if he should wait a half hour after taking is prescribed meds before taking systane- or if it will interfere- please call pt back thanks     Action Taken: Message routed to:  [facility]

## 2018-12-28 NOTE — TELEPHONE ENCOUNTER
Spoke to pt at 0918  Reviewed 5 minutes sufficient amount of time between eye drops  Jluis Ken RN 9:18 AM 12/28/18

## 2019-01-14 DIAGNOSIS — H40.033 ANATOMICAL NARROW ANGLE BORDERLINE GLAUCOMA OF BOTH EYES: ICD-10-CM

## 2019-01-15 RX ORDER — TIMOLOL MALEATE 5 MG/ML
1 SOLUTION/ DROPS OPHTHALMIC EVERY MORNING
Qty: 5 ML | Refills: 1 | Status: SHIPPED | OUTPATIENT
Start: 2019-01-15 | End: 2019-08-08

## 2019-01-15 RX ORDER — DORZOLAMIDE HCL 20 MG/ML
1 SOLUTION/ DROPS OPHTHALMIC 2 TIMES DAILY
Qty: 10 ML | Refills: 1 | Status: SHIPPED | OUTPATIENT
Start: 2019-01-15 | End: 2019-08-08

## 2019-01-15 RX ORDER — DORZOLAMIDE HYDROCHLORIDE AND TIMOLOL MALEATE 20; 5 MG/ML; MG/ML
1 SOLUTION/ DROPS OPHTHALMIC 2 TIMES DAILY
Qty: 1 BOTTLE | Refills: 11 | OUTPATIENT
Start: 2019-01-15

## 2019-01-15 NOTE — TELEPHONE ENCOUNTER
"Dorzolamide trusopt  2%  Last Office Visit :2/21/18  Future Office visit:  2/7/19    Timolol 0.5%    dorzolamide-timolol (COSOPT) 2-0.5 % ophthalmic solution:   Fax received from [facility] that reads, \"Medication not available together; will need 2 separate Rx's. The dorzolamide and timolol as separate Rx's\".      12/21/18  LORI Pinedo \"Cont Cosopt twice a day both eyes\".     "

## 2019-02-06 DIAGNOSIS — H40.033 ANATOMICAL NARROW ANGLE BORDERLINE GLAUCOMA OF BOTH EYES: Primary | ICD-10-CM

## 2019-02-07 ENCOUNTER — OFFICE VISIT (OUTPATIENT)
Dept: OPHTHALMOLOGY | Facility: CLINIC | Age: 54
End: 2019-02-07
Attending: OPHTHALMOLOGY
Payer: COMMERCIAL

## 2019-02-07 DIAGNOSIS — H40.033 ANATOMICAL NARROW ANGLE BORDERLINE GLAUCOMA OF BOTH EYES: ICD-10-CM

## 2019-02-07 PROCEDURE — 92133 CPTRZD OPH DX IMG PST SGM ON: CPT | Mod: ZF | Performed by: OPHTHALMOLOGY

## 2019-02-07 PROCEDURE — G0463 HOSPITAL OUTPT CLINIC VISIT: HCPCS | Mod: ZF

## 2019-02-07 PROCEDURE — 92132 CPTRZD OPH DX IMG ANT SGM: CPT | Mod: ZF | Performed by: OPHTHALMOLOGY

## 2019-02-07 ASSESSMENT — TONOMETRY
OD_IOP_MMHG: 10
IOP_METHOD: TONOPEN
OS_IOP_MMHG: 15

## 2019-02-07 ASSESSMENT — VISUAL ACUITY
OD_CC+: -1
METHOD: SNELLEN - LINEAR
OS_CC: 20/25
OS_CC+: -2
OD_CC: 20/20

## 2019-02-07 ASSESSMENT — REFRACTION_WEARINGRX
OD_SPHERE: +2.25
OD_ADD: +2.00
OS_ADD: +2.00
OS_AXIS: 006
OD_CYLINDER: +0.50
OS_SPHERE: +3.50
OD_AXIS: 133
OS_CYLINDER: +0.75

## 2019-02-07 ASSESSMENT — CONF VISUAL FIELD
METHOD: COUNTING FINGERS
OD_NORMAL: 1
OS_NORMAL: 1

## 2019-02-07 NOTE — PROGRESS NOTES
"Chief Complaint(s) and History of Present Illness(es)     Patient here for Laser Peripheral Iridotomy of the right eye due to   Anatomical narrow angle borderline glaucoma of both eyes. Had left PI   yesterday. His vision feels \"fish bowl\" like and his left eye feels   \"tight\". Has mild dizziness today. Used Latanoprost last night at about   11:30pm. Used Cosopt this morning at 7am.   Sandra JEREZ 12:04 PM December 21, 2018         Review of systems for the eyes was negative other than the pertinent positives/negatives listed in the HPI.      Patient is here for narrow angle glaucoma follow-up. Last seen 12/21/18 LPI in both eyes at that time. Patient has noticed significant improvement in headaches and eye pain that he associated with the increased IOP. He is taking Cosopt BID and Latanoprost at bedtime, consistently.    Assessment & Plan      Jaun Reilly is a 53 year old male with the following diagnoses:   1. Anatomical narrow angle borderline glaucoma of both eyes         S/P Laser peripheral iridotomy (LPI) left eye 12/6/18 and 12/21/18 (both patent)  S/P LPI right eye 12/21/18 (patent)    Intraocular pressure improved today in both eyes   Stable OCT nerve fiber layer in both eyes.  Visual field remains normal    Cont latanoprost at bedtime both eyes   Cont Cosopt twice a day both eyes  Plan for repeat field in 4-6 months  If Intraocular pressure remains low normal, could try stopping a drop    Patient disposition:   Return in about 6 months (around 8/7/2019) for VT only, 24-2 Dynamic VF.     Elaina Rodriguez MD  Ophthalmology Resident, PGY-2       Attending Physician Attestation:  Complete documentation of historical and exam elements from today's encounter can be found in the full encounter summary report (not reduplicated in this progress note).  I personally obtained the chief complaint(s) and history of present illness.  I confirmed and edited as necessary the review of systems, past " medical/surgical history, family history, social history, and examination findings as documented by others; and I examined the patient myself.  I personally reviewed the relevant tests, images, and reports as documented above.  I formulated and edited as necessary the assessment and plan and discussed the findings and management plan with the patient and family.Attending Physician Image/Tesing Attestation: I personally reviewed the ophthalmic test(s) associated with this encounter, agree with the interpretation(s) as documented by the resident/fellow, and have edited the corresponding report(s) as necessary.   . - Job Pinedo MD

## 2019-02-08 ASSESSMENT — EXTERNAL EXAM - LEFT EYE: OS_EXAM: NORMAL

## 2019-02-08 ASSESSMENT — SLIT LAMP EXAM - LIDS
COMMENTS: NORMAL
COMMENTS: NORMAL

## 2019-02-08 ASSESSMENT — EXTERNAL EXAM - RIGHT EYE: OD_EXAM: NORMAL

## 2019-02-09 ENCOUNTER — TELEPHONE (OUTPATIENT)
Dept: OPHTHALMOLOGY | Facility: CLINIC | Age: 54
End: 2019-02-09

## 2019-02-09 DIAGNOSIS — H40.033 ANATOMICAL NARROW ANGLE BORDERLINE GLAUCOMA OF BOTH EYES: Primary | ICD-10-CM

## 2019-02-09 RX ORDER — TIMOLOL MALEATE 5 MG/ML
1 SOLUTION/ DROPS OPHTHALMIC 2 TIMES DAILY
Qty: 1 BOTTLE | Refills: 3 | Status: SHIPPED | OUTPATIENT
Start: 2019-02-09 | End: 2019-08-08

## 2019-02-09 RX ORDER — DORZOLAMIDE HCL 20 MG/ML
1 SOLUTION/ DROPS OPHTHALMIC 2 TIMES DAILY
Qty: 1 BOTTLE | Refills: 3 | Status: SHIPPED | OUTPATIENT
Start: 2019-02-09 | End: 2019-08-08

## 2019-02-09 NOTE — TELEPHONE ENCOUNTER
Received call from patient that pharmacy does not have Cosopt. Ordered dorzolamide and timolol separately.     Elaina Rodriguez MD  Ophthalmology Resident, PGY-2

## 2019-08-08 ENCOUNTER — OFFICE VISIT (OUTPATIENT)
Dept: OPHTHALMOLOGY | Facility: CLINIC | Age: 54
End: 2019-08-08
Attending: OPHTHALMOLOGY
Payer: COMMERCIAL

## 2019-08-08 DIAGNOSIS — H40.033 ANATOMICAL NARROW ANGLE BORDERLINE GLAUCOMA OF BOTH EYES: Primary | ICD-10-CM

## 2019-08-08 PROCEDURE — G0463 HOSPITAL OUTPT CLINIC VISIT: HCPCS | Mod: ZF

## 2019-08-08 PROCEDURE — 92083 EXTENDED VISUAL FIELD XM: CPT | Mod: ZF | Performed by: OPHTHALMOLOGY

## 2019-08-08 RX ORDER — LATANOPROST 50 UG/ML
1 SOLUTION/ DROPS OPHTHALMIC AT BEDTIME
Qty: 1 BOTTLE | Refills: 11 | Status: SHIPPED | OUTPATIENT
Start: 2019-08-08 | End: 2020-04-17

## 2019-08-08 RX ORDER — DORZOLAMIDE HYDROCHLORIDE AND TIMOLOL MALEATE 20; 5 MG/ML; MG/ML
1 SOLUTION/ DROPS OPHTHALMIC 2 TIMES DAILY
Qty: 1 BOTTLE | Refills: 11 | Status: SHIPPED | OUTPATIENT
Start: 2019-08-08 | End: 2020-10-21

## 2019-08-08 ASSESSMENT — TONOMETRY
OS_IOP_MMHG: 16
IOP_METHOD: APPLANATION
OD_IOP_MMHG: 18

## 2019-08-08 ASSESSMENT — CONF VISUAL FIELD
OD_NORMAL: 1
METHOD: COUNTING FINGERS
OS_NORMAL: 1

## 2019-08-08 ASSESSMENT — REFRACTION_WEARINGRX
OD_AXIS: 133
OD_CYLINDER: +0.50
OS_CYLINDER: +0.75
OD_SPHERE: +2.25
OS_ADD: +2.00
OS_SPHERE: +3.50
OS_AXIS: 006
OD_ADD: +2.00

## 2019-08-08 ASSESSMENT — VISUAL ACUITY
CORRECTION_TYPE: GLASSES
OD_CC: 20/20
OS_CC: 20/20
OD_CC+: -2
OS_CC+: -2
METHOD: SNELLEN - LINEAR

## 2019-08-08 ASSESSMENT — SLIT LAMP EXAM - LIDS
COMMENTS: NORMAL
COMMENTS: NORMAL

## 2019-08-08 ASSESSMENT — CUP TO DISC RATIO
OS_RATIO: 0.35
OD_RATIO: 0.35

## 2019-08-08 ASSESSMENT — EXTERNAL EXAM - LEFT EYE: OS_EXAM: NORMAL

## 2019-08-08 ASSESSMENT — EXTERNAL EXAM - RIGHT EYE: OD_EXAM: NORMAL

## 2019-08-08 NOTE — PROGRESS NOTES
Chief Complaint(s) and History of Present Illness(es)     Glaucoma Follow-Up     Comments: 6 month follow-up  Anatomical narrow angle borderline glaucoma   of both eyes                   Comments     Pt denies any significant vision changes in either eye since last visit.  Complains of feeling of constant pressure both eyes and occasional   tearing.  Denies any pain, irritation, and discharge.  Currently using Cosopt BID BE and Latanoprost at bedtime BE.    Elayne Mayorga OT 8:06 AM August 8, 2019               Review of systems for the eyes was negative other than the pertinent positives/negatives listed in the HPI.      Assessment & Plan      Jaun Reilly is a 53 year old male with the following diagnoses:   1. Anatomical narrow angle borderline glaucoma of both eyes         S/P Laser peripheral iridotomy (LPI) left eye 12/6/18 and 12/21/18 (both patent)  S/P LPI right eye 12/21/18 (patent)  Intraocular pressure acceptable both eyes   Visual fields stable both eyes     Cont latanoprost at bedtime both eyes   Cont Cosopt twice a day both eyes    Patient disposition:   Return in about 6 months (around 2/8/2020) for DFE, OCT NFL.           Attending Physician Attestation:  Complete documentation of historical and exam elements from today's encounter can be found in the full encounter summary report (not reduplicated in this progress note).  I personally obtained the chief complaint(s) and history of present illness.  I confirmed and edited as necessary the review of systems, past medical/surgical history, family history, social history, and examination findings as documented by others; and I examined the patient myself.  I personally reviewed the relevant tests, images, and reports as documented above.  I formulated and edited as necessary the assessment and plan and discussed the findings and management plan with the patient and family. . - Job Pinedo MD

## 2019-08-08 NOTE — NURSING NOTE
Chief Complaints and History of Present Illnesses   Patient presents with     Glaucoma Follow-Up     6 month follow-up  Anatomical narrow angle borderline glaucoma of both eyes          Chief Complaint(s) and History of Present Illness(es)     Glaucoma Follow-Up     Comments: 6 month follow-up  Anatomical narrow angle borderline glaucoma of both eyes                   Comments     Pt denies any significant vision changes in either eye since last visit.  Complains of feeling of constant pressure both eyes and occasional tearing.  Denies any pain, irritation, and discharge.  Currently using Cosopt BID BE and Latanoprost at bedtime BE.    Elayne Mayorga OT 8:06 AM August 8, 2019

## 2019-09-28 ENCOUNTER — HEALTH MAINTENANCE LETTER (OUTPATIENT)
Age: 54
End: 2019-09-28

## 2019-12-13 ENCOUNTER — TELEPHONE (OUTPATIENT)
Dept: OPHTHALMOLOGY | Facility: CLINIC | Age: 54
End: 2019-12-13

## 2019-12-13 NOTE — TELEPHONE ENCOUNTER
[facility]    Phone Message    May a detailed message be left on voicemail: yes    Reason for Call:  patient feels he needs to come in to see a tech to check eye pressure before 6 month visit in Feb 2020     Please advise     Action Taken: Message routed to:  [facility]

## 2019-12-13 NOTE — TELEPHONE ENCOUNTER
Pt concerned of increase eye pressure between his 6 month eye visits    States gets intermittent bilateral headache pain and pressure pain around both eyes at same time couple times a week  No noted vision changes/halos  No nausea/vomiting    Pt requesting tech only eye pressure check    Recommended evaluation with eye doctor to review patency of previous laser peripheral iridotomy and check eye pressure    Offered today (Friday)/tomorrow (Saturday) with optometry    Scheduled tomorrow with [place]. Pedersen [place] [facility] [place]   Pt aware of date/time/location      Jluis Ken RN 10:59 AM 12/13/19

## 2020-02-10 DIAGNOSIS — H40.033 ANATOMICAL NARROW ANGLE BORDERLINE GLAUCOMA OF BOTH EYES: Primary | ICD-10-CM

## 2020-02-11 ENCOUNTER — OFFICE VISIT (OUTPATIENT)
Dept: OPHTHALMOLOGY | Facility: CLINIC | Age: 55
End: 2020-02-11
Attending: OPHTHALMOLOGY
Payer: COMMERCIAL

## 2020-02-11 DIAGNOSIS — H40.033 ANATOMICAL NARROW ANGLE BORDERLINE GLAUCOMA OF BOTH EYES: ICD-10-CM

## 2020-02-11 PROCEDURE — 92133 CPTRZD OPH DX IMG PST SGM ON: CPT | Mod: ZF | Performed by: OPHTHALMOLOGY

## 2020-02-11 PROCEDURE — G0463 HOSPITAL OUTPT CLINIC VISIT: HCPCS | Mod: ZF

## 2020-02-11 RX ORDER — DORZOLAMIDE HCL 20 MG/ML
1 SOLUTION/ DROPS OPHTHALMIC 2 TIMES DAILY
COMMUNITY
End: 2020-04-20

## 2020-02-11 ASSESSMENT — TONOMETRY
OD_IOP_MMHG: 15
OS_IOP_MMHG: 14
IOP_METHOD: APPLANATION

## 2020-02-11 ASSESSMENT — SLIT LAMP EXAM - LIDS
COMMENTS: NORMAL
COMMENTS: NORMAL

## 2020-02-11 ASSESSMENT — REFRACTION_WEARINGRX
OD_CYLINDER: +0.50
OS_SPHERE: +3.50
OD_AXIS: 133
OD_ADD: +2.00
OS_ADD: +2.00
OD_SPHERE: +2.25
OS_AXIS: 006
OS_CYLINDER: +0.75

## 2020-02-11 ASSESSMENT — CONF VISUAL FIELD
OD_NORMAL: 1
METHOD: COUNTING FINGERS
OS_NORMAL: 1

## 2020-02-11 ASSESSMENT — VISUAL ACUITY
CORRECTION_TYPE: GLASSES
OS_CC: 20/25
OD_CC: 20/20
METHOD: SNELLEN - LINEAR

## 2020-02-11 ASSESSMENT — CUP TO DISC RATIO
OD_RATIO: 0.35
OS_RATIO: 0.35

## 2020-02-11 ASSESSMENT — EXTERNAL EXAM - RIGHT EYE: OD_EXAM: NORMAL

## 2020-02-11 ASSESSMENT — EXTERNAL EXAM - LEFT EYE: OS_EXAM: NORMAL

## 2020-02-11 NOTE — PROGRESS NOTES
Chief Complaint(s) and History of Present Illness(es)     Glaucoma Follow-Up     Laterality: both eyes    Associated symptoms: dryness.  Negative for eye pain, tearing and   discharge    Treatment side effects: none    Compliance with Treatment: always    Pain scale: 0/10    Comments: 6 month follow-up Anatomical narrow angle borderline glaucoma   of both eyes               Comments     Pt denies any significant vision changes in either eye since last visit.  Pt complains of occasional dryness BE and pressure feeling BE.  Denies any pain, discharge, and tearing.  Currently using Dorzolamide BID BE and Latanoprost at bedtime BE.    Elayne Mayorga OT 8:21 AM February 11, 2020            Review of systems for the eyes was negative other than the pertinent positives/negatives listed in the HPI.      Assessment & Plan      Jaun Reilly is a 54 year old male with the following diagnoses:   1. Anatomical narrow angle borderline glaucoma of both eyes         S/P Laser peripheral iridotomy (LPI) left eye 12/6/18 and 12/21/18 (both patent)  S/P LPI right eye 12/21/18 (patent)  Intraocular pressure well controlled with drops in both eyes   Stable OCT Nerve fiber layer     Cont latanoprost at bedtime both eyes   Cont Cosopt twice a day both eyes      Patient disposition:   Return in about 6 months (around 8/11/2020) for VT only, gonio.           Attending Physician Attestation:  Complete documentation of historical and exam elements from today's encounter can be found in the full encounter summary report (not reduplicated in this progress note).  I personally obtained the chief complaint(s) and history of present illness.  I confirmed and edited as necessary the review of systems, past medical/surgical history, family history, social history, and examination findings as documented by others; and I examined the patient myself.  I personally reviewed the relevant tests, images, and reports as documented above.  I formulated  and edited as necessary the assessment and plan and discussed the findings and management plan with the patient and family. . - Job Pinedo MD

## 2020-02-11 NOTE — NURSING NOTE
Chief Complaints and History of Present Illnesses   Patient presents with     Glaucoma Follow-Up     6 month follow-up Anatomical narrow angle borderline glaucoma of both eyes      Chief Complaint(s) and History of Present Illness(es)     Glaucoma Follow-Up     Laterality: both eyes    Associated symptoms: dryness.  Negative for eye pain, tearing and discharge    Treatment side effects: none    Compliance with Treatment: always    Pain scale: 0/10    Comments: 6 month follow-up Anatomical narrow angle borderline glaucoma of both eyes               Comments     Pt denies any significant vision changes in either eye since last visit.  Pt complains of occasional dryness BE and pressure feeling BE.  Denies any pain, discharge, and tearing.  Currently using Dorzolamide BID BE and Latanoprost at bedtime BE.    Elayne Mayorga OT 8:21 AM February 11, 2020

## 2020-03-11 RX ORDER — DORZOLAMIDE HCL 20 MG/ML
SOLUTION/ DROPS OPHTHALMIC
Qty: 10 ML | OUTPATIENT
Start: 2020-03-11

## 2020-03-11 NOTE — TELEPHONE ENCOUNTER
Request for Trusopt, which is recorded by historical entry. Last clinic note: Cont latanoprost at bedtime both eyes   Cont Cosopt twice a day both eyes  Trusopt refill denied

## 2020-03-12 ENCOUNTER — TELEPHONE (OUTPATIENT)
Dept: OPHTHALMOLOGY | Facility: CLINIC | Age: 55
End: 2020-03-12

## 2020-03-12 DIAGNOSIS — H40.033 ANATOMICAL NARROW ANGLE BORDERLINE GLAUCOMA OF BOTH EYES: ICD-10-CM

## 2020-03-12 RX ORDER — LATANOPROST 50 UG/ML
1 SOLUTION/ DROPS OPHTHALMIC AT BEDTIME
Qty: 1 BOTTLE | Refills: 11 | Status: CANCELLED | OUTPATIENT
Start: 2020-03-12

## 2020-03-12 NOTE — TELEPHONE ENCOUNTER
Pt concerned upset was told to use cosopt (Dorzolamide/Timolol) or upset was not aware to use cosopt (dorzolamide/timolol)    Pt has been using orange cap only Dorzolamide    Reviewed with Dr. Pinedo prior to second call back to pt and would be ok to continue orange cap-- Dorzolamide eye drop only based on intraocular pressure if was using that drop instead of the dorzolamide/timolol prior to 2-    Pt not happy with the information    Stated would ask Dr. Pinedo to call back and pt would like that    Note to Dr. Pinedo/facilitator for callback    Jluis Ken RN 4:21 PM 03/12/20          [facility]    Phone Message    May a detailed message be left on voicemail: yes     Reason for Call: Other: Pt calling in to inform he int sure if it is because he has a new pharmacy  which is [facility] [address] in [place]       Action Taken: Message routed to:  [facility]     Travel Screening: Not Applicable

## 2020-03-12 NOTE — TELEPHONE ENCOUNTER
[facility]    Phone Message    May a detailed message be left on voicemail: yes     Reason for Call: Other: Patient called in because he was told by his pharmacy that the refill for this medication was denied and that he has to comee in for an appointment. He stated he was just seen 2 weeks ago and does not understand why he would have to come back in for another appointment. Please follow up with the patient to discuss. Thank you.     Action Taken: Message routed to:  [facility]    Travel Screening: Not Applicable

## 2020-04-17 ENCOUNTER — TELEPHONE (OUTPATIENT)
Dept: OPHTHALMOLOGY | Facility: CLINIC | Age: 55
End: 2020-04-17

## 2020-04-17 DIAGNOSIS — H40.033 ANATOMICAL NARROW ANGLE BORDERLINE GLAUCOMA OF BOTH EYES: ICD-10-CM

## 2020-04-17 DIAGNOSIS — H40.033 ANATOMICAL NARROW ANGLE BORDERLINE GLAUCOMA OF BOTH EYES: Primary | ICD-10-CM

## 2020-04-17 RX ORDER — LATANOPROST 50 UG/ML
1 SOLUTION/ DROPS OPHTHALMIC AT BEDTIME
Qty: 1 BOTTLE | Refills: 11 | Status: SHIPPED | OUTPATIENT
Start: 2020-04-17 | End: 2021-02-09

## 2020-04-17 NOTE — TELEPHONE ENCOUNTER
[facility]    Phone Message    May a detailed message be left on voicemail: yes     Reason for Call: Medication Refill Request    Has the patient contacted the pharmacy for the refill? Yes   Name of medication being requested: latanoprost (XALATAN) 0.005 % ophthalmic solution   Provider who prescribed the medication: Dr Pinedo  Pharmacy: [id number] [address] AT [facility]   Date medication is needed: ASAP         Action Taken: Message routed to:  [facility]    Travel Screening: Not Applicable

## 2020-04-20 RX ORDER — DORZOLAMIDE HCL 20 MG/ML
1 SOLUTION/ DROPS OPHTHALMIC
Qty: 10 ML | Refills: 11 | Status: SHIPPED | OUTPATIENT
Start: 2020-04-20 | End: 2021-08-17

## 2020-04-20 NOTE — TELEPHONE ENCOUNTER
DORZOLAMIDE 2% OPHTH SOLN 10ML   Dx:  Anatomical narrow angle borderline glaucoma of both eyes     Requested directions:  Place 1 drop into both eyes 2 times daily - Both Eyes  Current directions on the medication list:  Place 1 drop into both eyes 2 times daily - Both Eyes    Last Written Prescription Date:  8/8/2019  Last Fill Quantity: 1,   # refills: 11    Last Office Visit: 2/11/2020  Future Office visit: None    Attending Provider:      Job Pinedo MD   Ophthalmology      Last Clinic Note: 2/11/2020    Jaun Reilly is a 54 year old male with the following diagnoses:   1. Anatomical narrow angle borderline glaucoma of both eyes          S/P Laser peripheral iridotomy (LPI) left eye 12/6/18 and 12/21/18 (both patent)  S/P LPI right eye 12/21/18 (patent)  Intraocular pressure well controlled with drops in both eyes   Stable OCT Nerve fiber layer      Cont latanoprost at bedtime both eyes   Cont Cosopt twice a day both eyes        Patient disposition:   Return in about 6 months (around 8/11/2020) for VT only, gonio    10 mL, 11 Refills sent to pharm 4/20/2020    Sangita Ruiz RN  Central Triage Red Flags/Med Refills

## 2020-10-21 ENCOUNTER — OFFICE VISIT (OUTPATIENT)
Dept: OPHTHALMOLOGY | Facility: CLINIC | Age: 55
End: 2020-10-21
Attending: OPHTHALMOLOGY
Payer: COMMERCIAL

## 2020-10-21 DIAGNOSIS — H40.033 ANATOMICAL NARROW ANGLE BORDERLINE GLAUCOMA OF BOTH EYES: Primary | ICD-10-CM

## 2020-10-21 DIAGNOSIS — H52.03 HYPEROPIA OF BOTH EYES: ICD-10-CM

## 2020-10-21 PROCEDURE — G0463 HOSPITAL OUTPT CLINIC VISIT: HCPCS

## 2020-10-21 PROCEDURE — 99213 OFFICE O/P EST LOW 20 MIN: CPT | Mod: GC | Performed by: OPHTHALMOLOGY

## 2020-10-21 ASSESSMENT — GONIOSCOPY
OS_SUPERIOR: APPOSITIONAL
OD_NASAL: OPEN TO TM
OS_NASAL: OPEN TO TM
OD_SUPERIOR: APPOSITIONAL
OS_INFERIOR: OPEN TO TM
OD_INFERIOR: OPEN TO TM
OD_TEMPORAL: APPOSITIONAL
OS_TEMPORAL: APPOSITIONAL

## 2020-10-21 ASSESSMENT — EXTERNAL EXAM - LEFT EYE: OS_EXAM: NORMAL

## 2020-10-21 ASSESSMENT — SLIT LAMP EXAM - LIDS
COMMENTS: NORMAL
COMMENTS: NORMAL

## 2020-10-21 ASSESSMENT — TONOMETRY
OD_IOP_MMHG: 18
OS_IOP_MMHG: 14
OD_IOP_MMHG: 15
IOP_METHOD: TONOPEN
OS_IOP_MMHG: 13
IOP_METHOD: APPLANATION

## 2020-10-21 ASSESSMENT — VISUAL ACUITY
OS_CC: 20/30
OD_CC: 20/20
METHOD: SNELLEN - LINEAR
OS_PH_CC: 20/25
OS_CC+: +2

## 2020-10-21 ASSESSMENT — CONF VISUAL FIELD
OD_NORMAL: 1
OS_NORMAL: 1
METHOD: COUNTING FINGERS

## 2020-10-21 ASSESSMENT — REFRACTION_WEARINGRX
OD_AXIS: 133
OD_SPHERE: +2.25
OD_ADD: +2.00
OD_CYLINDER: +0.50
OS_SPHERE: +3.50
OS_ADD: +2.00
OS_AXIS: 006
OS_CYLINDER: +0.75

## 2020-10-21 ASSESSMENT — EXTERNAL EXAM - RIGHT EYE: OD_EXAM: NORMAL

## 2020-10-21 NOTE — PROGRESS NOTES
Chief Complaint(s) and History of Present Illness(es)     Glaucoma Follow-Up     Laterality: both eyes    Associated symptoms: eye pain (Upper BE) and dryness.  Negative for   floaters and flashes    Pain scale: 1/10              Comments     Jaun is here to continue care for Anatomical narrow angle borderline   glaucoma of both eyes. He states some changes since last visit that he   would like to discuss with provider.     Marcin Kinney COT 7:36 AM October 21, 2020       Noting some mild pain when looking up and the right with his left eye for the past several days. No redness or discharge.   Occasionally misses Cosopt BID and is only daily, latanoprost at bedtime.         Review of systems for the eyes was negative other than the pertinent positives/negatives listed in the HPI.      Assessment & Plan      Jaun Reilly is a 54 year old male with the following diagnoses:   1. Anatomical narrow angle borderline glaucoma of both eyes    2. Hyperopia of both eyes         S/P Laser peripheral iridotomy (LPI) left eye 12/6/18 and 12/21/18 (both patent)  S/P LPI right eye 12/21/18 (patent)    Intraocular pressure well controlled with drops in both eyes   Gonio 10/21/20  right eye: open to TM nasally, inferiorly, open 360 to TM dynamically, no PAS  left eye: open to scleral spur nasal, TM inferiorly, no PAS    Cont latanoprost at bedtime both eyes   Cont Cosopt twice a day both eyes    Return precautions reviewed       Patient disposition:   Return in about 6 months (around 4/21/2021) for V/T/D OCT RNFL.           Americo Michael MD  Ophthalmology PGY-3  [facility]    Attending Physician Attestation:  Complete documentation of historical and exam elements from today's encounter can be found in the full encounter summary report (not reduplicated in this progress note).  I personally obtained the chief complaint(s) and history of present illness.  I confirmed and edited as necessary the review of systems,  past medical/surgical history, family history, social history, and examination findings as documented by others; and I examined the patient myself.  I personally reviewed the relevant tests, images, and reports as documented above.  I formulated and edited as necessary the assessment and plan and discussed the findings and management plan with the patient and family. . - Job Pinedo MD

## 2020-10-21 NOTE — PROGRESS NOTES
CHIEF COMPLAINT:  Physical exam.      HISTORY OF PRESENT ILLNESS:  Philip is a 51-year-old who initially came in to talk about a hernia surgery that he had about a year ago, also to look at a couple spots of skin.  However, he really wanted to try and get a physical exam in today so I went ahead and did that for him.      His surgery was on 04/15 last year.  He had quite a bit of pain after it and the surgery was actually to remove mesh from a previous surgery.  He understood that this would be a complicated and highly detailed surgery.  Finally, he feels that he is back to as good as he could possibly be.  He is no longer having any pain.  He thanked me profusely for listening to him and referring him to such a wonderful surgeon (Dr. Jhonatan Barragan) and he could not be happier at this point.      ACTIVE MEDICAL PROBLEMS:  Reviewed.  He has a history of basal cell carcinomas.  There are 2 spots he would like me to look at on his abdominal skin.      CURRENT MEDICATIONS:  Consist only of supplements.      SOCIAL HISTORY:  Never a smoker.  He is currently single.  Alcohol is social.  He tries to get plenty of activity.  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  He has got a couple of other things that he is working on now.      HEALTHCARE MAINTENANCE:  He wears glasses and his eyes are checked on a regular basis.  His hearing is fine.  Dental care is up-to-date.  Blood pressure 122/81.  Body mass index is 21.71.  Weight is down about 6 pounds and this has been intentional.  He would like to keep it there.  Immunizations are up-to-date.  At some point, we will go ahead and check a hepatitis C antibody as he was born in 1965.  Lipid panel, however, was done within the last year and we have a glucose within the last year as well.  He really does not need to have that done.  He has had some anal condylomata.  He was last checked 08/27/2015 and knows that he is due to go back for that and will schedule that.      c/o lacerated wound to rt. thumb from openning a can, no active bleeding   REVIEW OF SYSTEMS:  A 10-point review of systems is negative.      OBJECTIVE:  Philip is in no distress.  He is very upbeat today.  Blood pressure is 122/81 with a pulse of 79 and regular.  He is 5 feet 9.5 inches in height and weighs 149 pounds.  O2 sats 100% on room air.  BMI 21.71.   HEENT:  Head is normocephalic, atraumatic.  Ears are free of any cerumen.  The TMs look fine.  There is no pain with palpation of the frontal or maxillary sinuses.  Eyes are grossly normal.  Nose is free of any congestion or discharge.  Teeth in good repair.  Mucous membranes are moist.  Throat looks benign.   NECK:  Supple without adenopathy or thyromegaly.  No carotid bruits are heard, no JVD.   BACK:  Smooth and straight.  No pain to percussion.  No CVA tenderness.   LUNGS:  Clear to auscultation bilaterally.   HEART:  Reveals a regular rate and rhythm without murmur.  Benign.  Two small nevi.  Reassurance given.  Neither looked cancerous.   EXTREMITIES:  Ankles are free of any edema.  Good peripheral pulses.      ASSESSMENT AND PLAN:   1.  Adult physical exam, up-to-date with health care maintenance strategies.   2.  Status post mesh removal from previous hernia surgery.  Not described above, but he does have 2 direct hernias bilaterally, but they are painless.  He would rather live with them for a few more years before considering doing any other kind of surgery.  He is very happy with the results.  Surgically, things have healed well.   3.  Follow up with Colorectal Surgery, given his anal condylomata.   4.  Call with any problems or questions.  I look forward to seeing him back in approximately 1 year.

## 2020-10-21 NOTE — NURSING NOTE
Chief Complaints and History of Present Illnesses   Patient presents with     Glaucoma Follow-Up     Chief Complaint(s) and History of Present Illness(es)     Glaucoma Follow-Up     Laterality: both eyes    Associated symptoms: eye pain (Upper BE) and dryness.  Negative for floaters and flashes    Pain scale: 1/10              Comments     Jaun is here to continue care for Anatomical narrow angle borderline glaucoma of both eyes. He states some changes since last visit that he would like to discuss with provider.     Marcin Kinney COT 7:36 AM October 21, 2020

## 2020-12-16 ENCOUNTER — TELEPHONE (OUTPATIENT)
Dept: OPHTHALMOLOGY | Facility: CLINIC | Age: 55
End: 2020-12-16

## 2020-12-16 NOTE — TELEPHONE ENCOUNTER
Spoke to pt at 1547    Right eye irritation  slight redness  Increase itching  Eye drops stinging    Scheduled evaluation tomorrow with Dr. Russ/Dr. Pinedo     Reviewed may increase use of artificial tears and may use with increase frequency more than 4/day if preservative free    Jluis Ken RN 3:53 PM 12/16/20              [facility]    Phone Message    May a detailed message be left on voicemail: yes     Reason for Call: Other:    Pt is having right eye irritation for a couple of days now. There seems to be scratching around.     Pt states that he did rub his eye in the middle of the night and wonder if it's connected?     Please call back asap.       Action Taken: Other:  eye    Travel Screening: Not Applicable

## 2020-12-17 ENCOUNTER — OFFICE VISIT (OUTPATIENT)
Dept: OPHTHALMOLOGY | Facility: CLINIC | Age: 55
End: 2020-12-17
Attending: OPHTHALMOLOGY
Payer: COMMERCIAL

## 2020-12-17 DIAGNOSIS — H57.89 REDNESS OF RIGHT EYE: ICD-10-CM

## 2020-12-17 DIAGNOSIS — T15.11XA FOREIGN BODY OF RIGHT CONJUNCTIVAL SAC, INITIAL ENCOUNTER: Primary | ICD-10-CM

## 2020-12-17 PROCEDURE — G0463 HOSPITAL OUTPT CLINIC VISIT: HCPCS

## 2020-12-17 PROCEDURE — 65205 REMOVE FOREIGN BODY FROM EYE: CPT | Mod: RT | Performed by: OPHTHALMOLOGY

## 2020-12-17 PROCEDURE — 99213 OFFICE O/P EST LOW 20 MIN: CPT | Mod: 25 | Performed by: OPHTHALMOLOGY

## 2020-12-17 PROCEDURE — 65205 REMOVE FOREIGN BODY FROM EYE: CPT | Performed by: STUDENT IN AN ORGANIZED HEALTH CARE EDUCATION/TRAINING PROGRAM

## 2020-12-17 ASSESSMENT — VISUAL ACUITY
OS_PH_CC: 20/25
OS_CC: 20/30
OD_CC+: -1
OD_CC: 20/20
METHOD: SNELLEN - LINEAR

## 2020-12-17 ASSESSMENT — CONF VISUAL FIELD
METHOD: COUNTING FINGERS
OS_NORMAL: 1
OD_NORMAL: 1

## 2020-12-17 ASSESSMENT — REFRACTION_WEARINGRX
OS_SPHERE: +3.50
OD_ADD: +2.00
OS_AXIS: 006
OS_ADD: +2.00
OD_SPHERE: +2.25
OD_AXIS: 133
OS_CYLINDER: +0.75
OD_CYLINDER: +0.50

## 2020-12-17 ASSESSMENT — EXTERNAL EXAM - LEFT EYE: OS_EXAM: NORMAL

## 2020-12-17 ASSESSMENT — TONOMETRY
OD_IOP_MMHG: 18
OS_IOP_MMHG: 21
IOP_METHOD: TONOPEN

## 2020-12-17 ASSESSMENT — SLIT LAMP EXAM - LIDS: COMMENTS: NORMAL

## 2020-12-17 ASSESSMENT — EXTERNAL EXAM - RIGHT EYE: OD_EXAM: NORMAL

## 2020-12-17 NOTE — NURSING NOTE
Chief Complaints and History of Present Illnesses   Patient presents with     Eye Pain Right Eye     Chief Complaint(s) and History of Present Illness(es)     Eye Pain Right Eye               Comments     Jaun is here today complaining of irritation and itching of RE that has been present for the past four days, with some pain off and on. He believes something got into his RE to start this entire episode. He feels vision is the same as last visit    Marcin Kinney COT 2:20 PM December 17, 2020

## 2020-12-17 NOTE — PROGRESS NOTES
Chief Complaint(s) and History of Present Illness(es)     Eye Pain Right Eye               Comments     Jaun is here today complaining of irritation and itching of RE that has   been present for the past four days, with some pain off and on. He   believes something got into his RE to start this entire episode. He feels   vision is the same as last visit    Marcin Kinney COT 2:20 PM December 17, 2020      Mr. Reilly,  Is a 55 year old man here for acute visit due to 4 days or right sided ey redness and persistent itchiness.   Notices that it is itching at night and waking him up at night.     No eye drops today, due to pain from the eye drops. Felt like something scraping the eyeball the last few days.  Intermittent sharp stabbing pain.    No trama that he can recall. No recent contact use.      Review of systems for the eyes was negative other than the pertinent positives/negatives listed in the HPI.      Assessment & Plan      Jaun Reilly is a 55 year old male with the following diagnoses:   1. Foreign body of right conjunctival sac, initial encounter    2. Redness of right eye      Acute visit for eye redness nasally  Eyelash found stuck in the upper punctum with globe touch - removed at slit lamp with forceps today  No K abrasion or infection  Secondary blepharitis  Encouraged warm compressed and artificial tears    S/P Laser peripheral iridotomy (LPI) left eye 12/6/18 and 12/21/18 (both patent)  S/P LPI right eye 12/21/18 (patent)    Intraocular pressure well controlled with drops in both eyes   Cont latanoprost at bedtime both eyes   Cont dorzolamide twice a day both eyes  Return precautions reviewed     Patient disposition:   Return for regularly scheduled follow up for POAG with Dr. Pinedo, sooner if needed.     Viktoriya Russ MD PGY2  Department of Ophthalmology      Attending Physician Attestation:  Complete documentation of historical and exam elements from today's encounter can be found in the  full encounter summary report (not reduplicated in this progress note).  I personally obtained the chief complaint(s) and history of present illness.  I confirmed and edited as necessary the review of systems, past medical/surgical history, family history, social history, and examination findings as documented by others; and I examined the patient myself.  I personally reviewed the relevant tests, images, and reports as documented above.  I formulated and edited as necessary the assessment and plan and discussed the findings and management plan with the patient and family. .Attending Physician Procedure Attestation: I was present for the entire procedure      - Job Pinedo MD

## 2020-12-29 ENCOUNTER — TELEPHONE (OUTPATIENT)
Dept: OPHTHALMOLOGY | Facility: CLINIC | Age: 55
End: 2020-12-29
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
## 2021-01-10 ENCOUNTER — HEALTH MAINTENANCE LETTER (OUTPATIENT)
Age: 56
End: 2021-01-10

## 2021-02-09 ENCOUNTER — TELEPHONE (OUTPATIENT)
Dept: OPHTHALMOLOGY | Facility: CLINIC | Age: 56
End: 2021-02-09

## 2021-02-09 DIAGNOSIS — H40.033 ANATOMICAL NARROW ANGLE BORDERLINE GLAUCOMA OF BOTH EYES: ICD-10-CM

## 2021-02-09 RX ORDER — LATANOPROST 50 UG/ML
1 SOLUTION/ DROPS OPHTHALMIC AT BEDTIME
Qty: 2.5 ML | Refills: 2 | Status: SHIPPED | OUTPATIENT
Start: 2021-02-09 | End: 2021-02-09

## 2021-02-09 RX ORDER — LATANOPROST 50 UG/ML
1 SOLUTION/ DROPS OPHTHALMIC AT BEDTIME
Qty: 2.5 ML | Refills: 11 | Status: SHIPPED | OUTPATIENT
Start: 2021-02-09 | End: 2021-08-17

## 2021-02-09 NOTE — TELEPHONE ENCOUNTER
Last visit date 12/17/20 Jeb/ [facility]  Note: Intraocular pressure well controlled with drops in both eyes   Cont latanoprost at bedtime both eyes   Cont dorzolamide twice a day both eyes

## 2021-02-09 NOTE — TELEPHONE ENCOUNTER
Pt has f/u appt in April    Pt upset about refill last time only had 2 refills    Provided one year refills to for glaucoma drops    Jluis Ken RN 2:23 PM 02/09/21          [facility]    Phone Message    May a detailed message be left on voicemail: yes     Reason for Call: Other:    Pt is calling about rx, Latanoprost. Pt wonders why this rx does not have enough refills to last pt 6 months. Pt typically sees Pinedo every 6 months. Is this something that can be fixed? Pt states that everytime he runs out, he has to go a couple of days without it. Pt questions if that is ok? Will pt go blind not taking this rx every day?     Please follow-up with pt to advise.     Action Taken: Other:  eye     Travel Screening: Not Applicable

## 2021-04-20 DIAGNOSIS — H40.033 ANATOMICAL NARROW ANGLE BORDERLINE GLAUCOMA OF BOTH EYES: Primary | ICD-10-CM

## 2021-04-21 ENCOUNTER — OFFICE VISIT (OUTPATIENT)
Dept: OPHTHALMOLOGY | Facility: CLINIC | Age: 56
End: 2021-04-21
Attending: OPHTHALMOLOGY
Payer: COMMERCIAL

## 2021-04-21 DIAGNOSIS — H40.033 ANATOMICAL NARROW ANGLE BORDERLINE GLAUCOMA OF BOTH EYES: Primary | ICD-10-CM

## 2021-04-21 DIAGNOSIS — H04.123 DRY EYES, BILATERAL: ICD-10-CM

## 2021-04-21 DIAGNOSIS — H10.45 CHRONIC ALLERGIC CONJUNCTIVITIS: ICD-10-CM

## 2021-04-21 PROCEDURE — 92133 CPTRZD OPH DX IMG PST SGM ON: CPT | Performed by: OPHTHALMOLOGY

## 2021-04-21 PROCEDURE — 99214 OFFICE O/P EST MOD 30 MIN: CPT | Mod: GC | Performed by: OPHTHALMOLOGY

## 2021-04-21 PROCEDURE — G0463 HOSPITAL OUTPT CLINIC VISIT: HCPCS

## 2021-04-21 RX ORDER — OLOPATADINE HYDROCHLORIDE 2 MG/ML
1 SOLUTION/ DROPS OPHTHALMIC DAILY
Qty: 2.5 ML | Refills: 3 | Status: CANCELLED | OUTPATIENT
Start: 2021-04-21

## 2021-04-21 ASSESSMENT — CONF VISUAL FIELD
OS_NORMAL: 1
OD_NORMAL: 1
METHOD: COUNTING FINGERS

## 2021-04-21 ASSESSMENT — REFRACTION_WEARINGRX
OS_AXIS: 006
OD_ADD: +2.00
OD_CYLINDER: +0.50
OD_AXIS: 133
OS_SPHERE: +3.50
OD_SPHERE: +2.25
OS_ADD: +2.00
OS_CYLINDER: +0.75

## 2021-04-21 ASSESSMENT — TONOMETRY
OD_IOP_MMHG: 19
IOP_METHOD: APPLANATION BY JMK
OS_IOP_MMHG: 21
OS_IOP_MMHG: 20
IOP_METHOD: APPLANATION
OD_IOP_MMHG: 21

## 2021-04-21 ASSESSMENT — EXTERNAL EXAM - RIGHT EYE: OD_EXAM: NORMAL

## 2021-04-21 ASSESSMENT — VISUAL ACUITY
METHOD: SNELLEN - LINEAR
CORRECTION_TYPE: GLASSES
OS_CC: 20/25-
OD_CC: 20/20-3

## 2021-04-21 ASSESSMENT — CUP TO DISC RATIO
OS_RATIO: 0.35
OD_RATIO: 0.35

## 2021-04-21 ASSESSMENT — EXTERNAL EXAM - LEFT EYE: OS_EXAM: NORMAL
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
## 2021-04-28 ENCOUNTER — TELEPHONE (OUTPATIENT)
Dept: OPHTHALMOLOGY | Facility: CLINIC | Age: 56
End: 2021-04-28

## 2021-04-30 ENCOUNTER — TELEPHONE (OUTPATIENT)
Dept: OPHTHALMOLOGY | Facility: CLINIC | Age: 56
End: 2021-04-30

## 2021-04-30 NOTE — TELEPHONE ENCOUNTER
Called at 1530 and spoke to pt at 1539    Pt states lash in eye times 2 days and more irritating today    Pt states has happened in past and typically resolves on own    No clinic this Friday afternoon    Reviewed may use frequent preservative free tears and may use lubricating eye ointment at night    Reviewed to call [phone] option 4 for information to page on call eye provider for any worsening symptoms/redness/pain/vision changes    Pt verbally demonstrated understanding    Note to on call for review/amendment if applicable    Jluis Ken RN 3:43 PM 04/30/21           [facility]    Phone Message    May a detailed message be left on voicemail: yes     Reason for Call: Symptoms or Concerns     If patient has red-flag symptoms, warm transfer to triage line    Current symptom or concern: debris in eye    Symptoms have been present for:  2 day(s)    Has patient previously been seen for this? No    By : Dr Pinedo    Date: 4/21/2021    Are there any new or worsening symptoms? Yes      Action Taken: Message routed to:  [facility]    Travel Screening: Not Applicable

## 2021-07-15 ENCOUNTER — TELEPHONE (OUTPATIENT)
Dept: OPHTHALMOLOGY | Facility: CLINIC | Age: 56
End: 2021-07-15

## 2021-07-15 NOTE — TELEPHONE ENCOUNTER
Last note from April faxed per pt request for continuity of care-- dilation on notes    Jluis Ken RN 8:32 AM 07/15/21          [facility]    Phone Message    May a detailed message be left on voicemail: yes     Reason for Call: Form or Letter   Type or form/letter needing completion: Copy of last office visit notes - be sure to include stating that his Eyes were dilated  Provider: Dr Pinedo   Date form needed: ASAP - Pt is there right now  Once completed: Fax form to: [phone] [facility]      Action Taken: Message routed to:  [facility]    Travel Screening: Not Applicable

## 2021-08-17 ENCOUNTER — TELEPHONE (OUTPATIENT)
Dept: OPHTHALMOLOGY | Facility: CLINIC | Age: 56
End: 2021-08-17

## 2021-08-17 DIAGNOSIS — H40.033 ANATOMICAL NARROW ANGLE BORDERLINE GLAUCOMA OF BOTH EYES: ICD-10-CM

## 2021-08-17 RX ORDER — LATANOPROST 50 UG/ML
1 SOLUTION/ DROPS OPHTHALMIC AT BEDTIME
Qty: 2.5 ML | Refills: 11 | Status: SHIPPED | OUTPATIENT
Start: 2021-08-17 | End: 2022-06-15

## 2021-08-17 RX ORDER — DORZOLAMIDE HCL 20 MG/ML
1 SOLUTION/ DROPS OPHTHALMIC
Qty: 10 ML | Refills: 11 | Status: SHIPPED | OUTPATIENT
Start: 2021-08-17 | End: 2022-06-15

## 2021-08-17 NOTE — TELEPHONE ENCOUNTER
trusopt and latanoprost sent to pharmacy pt left on voicemail-- [id number] - [place], [place] - [address] AT [address]    Called pt back to confirm Rx's sent at 1810 and no answer and unable to leave voicemail times 2    Jluis Ken RN 6:13 PM 21        Jaun Reilly [phone]      Left message at [phone]    Asked to call direct triage number to verify eye drops/drops and pharmacy    Jluis Ken RN 5:58 PM 21          [facility]    Phone Message    May a detailed message be left on voicemail: yes     Reason for Call: Other: Pt calling in very upset that he cannot get his eye drops again, he would like to know why this continues to happen, the pharmacy is informing the pt the order is . Pt is very upset and demanded an answer on why this is happeneing again and then hung up as writer was talking      Action Taken: Message routed to:  [facility]     Travel Screening: Not Applicable

## 2021-09-14 ENCOUNTER — NURSE TRIAGE (OUTPATIENT)
Dept: NURSING | Facility: CLINIC | Age: 56
End: 2021-09-14

## 2021-09-14 ENCOUNTER — OFFICE VISIT (OUTPATIENT)
Dept: OPHTHALMOLOGY | Facility: CLINIC | Age: 56
End: 2021-09-14
Attending: OPHTHALMOLOGY
Payer: COMMERCIAL

## 2021-09-14 DIAGNOSIS — H40.033 ANATOMICAL NARROW ANGLE BORDERLINE GLAUCOMA OF BOTH EYES: Primary | ICD-10-CM

## 2021-09-14 DIAGNOSIS — H10.45 CHRONIC ALLERGIC CONJUNCTIVITIS: ICD-10-CM

## 2021-09-14 DIAGNOSIS — H04.123 DRY EYES, BILATERAL: ICD-10-CM

## 2021-09-14 PROCEDURE — G0463 HOSPITAL OUTPT CLINIC VISIT: HCPCS

## 2021-09-14 PROCEDURE — 99213 OFFICE O/P EST LOW 20 MIN: CPT | Mod: GC | Performed by: STUDENT IN AN ORGANIZED HEALTH CARE EDUCATION/TRAINING PROGRAM

## 2021-09-14 ASSESSMENT — EXTERNAL EXAM - LEFT EYE: OS_EXAM: NORMAL

## 2021-09-14 ASSESSMENT — CONF VISUAL FIELD
OS_NORMAL: 1
OD_NORMAL: 1

## 2021-09-14 ASSESSMENT — VISUAL ACUITY
OD_CC: 20/20
OS_CC+: -1
METHOD: SNELLEN - LINEAR
OS_CC: 20/25
OD_CC+: -1
CORRECTION_TYPE: GLASSES

## 2021-09-14 ASSESSMENT — TONOMETRY
IOP_METHOD: TONOPEN
OD_IOP_MMHG: 19
OS_IOP_MMHG: 14

## 2021-09-14 ASSESSMENT — EXTERNAL EXAM - RIGHT EYE: OD_EXAM: NORMAL

## 2021-09-14 ASSESSMENT — CUP TO DISC RATIO
OD_RATIO: 0.35
OS_RATIO: 0.35

## 2021-09-14 NOTE — TELEPHONE ENCOUNTER
Patient calling reporting having left leg pain. Reports he has dizziness and has mild shortness of breathe. Per guideline, advised patient to be seen at the emergency department. Caller verbalized understanding. Denies further questions.      Ollie Velazquez RN  [facility]     COVID 19 Nurse Triage Plan/Patient Instructions    Please be aware that novel coronavirus (COVID-19) may be circulating in the community. If you develop symptoms such as fever, cough, or SOB or if you have concerns about the presence of another infection including coronavirus (COVID-19), please contact your health care provider or visit [email or web address].     Disposition/Instructions    ED Visit recommended. Follow protocol based instructions.     Bring Your Own Device:  Please also bring your smart device(s) (smart phones, tablets, laptops) and their charging cables for your personal use and to communicate with your care team during your visit.    Thank you for taking steps to prevent the spread of this virus.  o Limit your contact with others.  o Wear a simple mask to cover your cough.  o Wash your hands well and often.    Resources    [facility]: About COVID-19: [email or web address]    CDC: What to Do If You're Sick: www.cdc.gov/coronavirus/2019-ncov/about/steps-when-sick.html    CDC: Ending Home Isolation: www.cdc.gov/coronavirus/2019-ncov/hcp/disposition-in-home-patients.html     CDC: Caring for Someone: www.cdc.gov/coronavirus/2019-ncov/if-you-are-sick/care-for-someone.html     [facility]: Interim Guidance for Hospital Discharge to Home: www.health.[facility].[place]/diseases/coronavirus/hcp/hospdischarge.pdf    [facility] clinical trials (COVID-19 research studies): [email or web address]/um-clinical-trials     Below are the COVID-19 hotlines at the [facility] of Health ([facility]). Interpreters are available.   o For health questions: Call 878-181-8991 or 1-420.335.5087 (7 a.m. to 7  p.m.)  o For questions about schools and childcare: Call 933-364-1165 or 1-628.527.2403 (7 a.m. to 7 p.m.)                       Reason for Disposition    Difficulty breathing    Additional Information    Negative: Looks like a broken bone or dislocated joint (e.g., crooked or deformed)    Negative: Sounds like a life-threatening emergency to the triager    Negative: Chest pain    Protocols used: LEG PAIN-A-AH

## 2021-09-14 NOTE — PROGRESS NOTES
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 He has dry eye syndrome and chronic allergic conjunctivitis attributing to eyes feeling \"tight\" and has been using PFATs and Zyrtec PO.   Over the last 2 months, he says he has developed a feeling of warmth and headache as if he was constantly hungover; the feeling lasts 1-2 hours or can last the entire day. He doesn't have any vision changes, no occular pain, no lacrimation, no flashes, no floaters.   GCA review of systems completed today:  (-) Vision loss, (+) Double vision, (-) Eye pain, (-) Scalp Tenderness, (-) Jaw claudication, (-) Fever, (-) weight change, (-) new Joint and Muscle ache.    He began a NRTI combo PrEP formulation 3 months ago and waited only 5 days before a sexual encounter and is concerned that this could be HIV related. He has felt this way before for many years but now it's becoming more frequent and he just wanted to make sure this feeling is not related to his eyes.     Eye medications: dorzolamide BID, latanoprost at bedtime, PF ATs all both eyes    -----------------------------------------------------------------------------------    Assessment & Plan  1. Headache and warmth  - GCA ROS negative with palpable temporal pulses   - Not acute angle closure  - Unlikely to be due to SHANNAN  - Benign occular exam  Plan  - f/u PCP and routine HIV surveillance  - If patient has a recurrence of these symptoms, please let patient come in for tech visit with pressure check    2. Anatomic narrow angle borderline glaucoma, both eyes  - S/P Laser peripheral iridotomy (LPI) left eye 12/6/18 and 12/21/18 (both patent)  - OCT RNFL 04/21/2021 shows left eye with possible progression of superior and inferior RNFL thinning.   - IOP 09/14/2021 19/14 (from 21/21) unlikely contributing to symptoms  - " Gonio 09/14/2021:    Right eye: open to non-pigmented    Left eye: open to non-pigmented   Plan  - Continue latanoprost at bedtime and dorzolamide BID   - Keep appointment with Dr. Pinedo 10/20/2021    3. Blepharitis, both eyes  4. Dry eye syndrome, both eyes  5. Chronic allergic conjunctivitis, both eyes  - Blepharitis of upper and lower eyelids, some follicles today, surface does not appear particularly dry  Plan  - Start warm compresses, lid hygiene  - Continue PFATs    Patient disposition: Keep appointment with Dr. Pinedo 10/20/2021 for VT only, Gonio, OCT NFL, 24-2 Dynamic VF.      Seen and discussed with Dr. Aron Pinedo.    My privilege to be part of your care,  Jno Bell MD, MSc  Ophthalmology PGY-2 resident physician  Pager: [phone]        Attending Physician Attestation:  Complete documentation of historical and exam elements from today's encounter can be found in the full encounter summary report (not reduplicated in this progress note).  I personally obtained the chief complaint(s) and history of present illness.  I confirmed and edited as necessary the review of systems, past medical/surgical history, family history, social history, and examination findings as documented by others; and I examined the patient myself.  I personally reviewed the relevant tests, images, and reports as documented above.  I formulated and edited as necessary the assessment and plan and discussed the findings and management plan with the patient and family. . - Job Pinedo MD

## 2021-09-30 ENCOUNTER — TRANSFERRED RECORDS (OUTPATIENT)
Dept: HEALTH INFORMATION MANAGEMENT | Facility: CLINIC | Age: 56
End: 2021-09-30

## 2021-10-01 ENCOUNTER — TELEPHONE (OUTPATIENT)
Dept: OPHTHALMOLOGY | Facility: CLINIC | Age: 56
End: 2021-10-01

## 2021-10-01 NOTE — TELEPHONE ENCOUNTER
Pt seen by local eye doctor for eye pressure check.    Full eye exam performed at visit-- Dr. Rochelle Zhu    Results will be faxed to Dr. Pinedo for review    Pt plans on continuing eye care with Dr. Pinedo and been happy with Dr. Pinedo's eye care    Note to Dr. Pinedo/facilitator for review of notes once recieved    Jluis Ken RN 3:14 PM 10/01/21

## 2021-10-18 DIAGNOSIS — H40.033 ANATOMICAL NARROW ANGLE BORDERLINE GLAUCOMA OF BOTH EYES: Primary | ICD-10-CM

## 2021-10-20 ENCOUNTER — OFFICE VISIT (OUTPATIENT)
Dept: OPHTHALMOLOGY | Facility: CLINIC | Age: 56
End: 2021-10-20
Attending: OPHTHALMOLOGY
Payer: COMMERCIAL

## 2021-10-20 DIAGNOSIS — H40.033 ANATOMICAL NARROW ANGLE BORDERLINE GLAUCOMA OF BOTH EYES: Primary | ICD-10-CM

## 2021-10-20 DIAGNOSIS — H04.123 DRY EYES, BILATERAL: ICD-10-CM

## 2021-10-20 PROCEDURE — 92133 CPTRZD OPH DX IMG PST SGM ON: CPT | Performed by: OPHTHALMOLOGY

## 2021-10-20 PROCEDURE — 99214 OFFICE O/P EST MOD 30 MIN: CPT | Performed by: OPHTHALMOLOGY

## 2021-10-20 PROCEDURE — G0463 HOSPITAL OUTPT CLINIC VISIT: HCPCS

## 2021-10-20 PROCEDURE — 92083 EXTENDED VISUAL FIELD XM: CPT | Performed by: OPHTHALMOLOGY

## 2021-10-20 ASSESSMENT — GONIOSCOPY
OD_TEMPORAL: APPOSITION
OD_NASAL: TM
OS_TEMPORAL: APPOSITION
OD_SUPERIOR: APPOSITION
OD_INFERIOR: TM
OS_INFERIOR: TM
OS_SUPERIOR: APPOSITION
OS_NASAL: APPOSITION

## 2021-10-20 ASSESSMENT — CUP TO DISC RATIO
OD_RATIO: 0.35
OS_RATIO: 0.35

## 2021-10-20 ASSESSMENT — REFRACTION_MANIFEST
OS_CYLINDER: +0.50
OD_ADD: +2.25
OS_ADD: +2.25
OD_AXIS: 097
OS_AXIS: 002
OD_SPHERE: +1.75
OD_CYLINDER: +1.00
OS_SPHERE: +2.75

## 2021-10-20 ASSESSMENT — VISUAL ACUITY
CORRECTION_TYPE: GLASSES
OD_CC+: -2
METHOD: SNELLEN - LINEAR
OD_CC: 20/20
OS_CC: 20/25

## 2021-10-20 ASSESSMENT — REFRACTION_WEARINGRX
OS_ADD: +2.00
OD_AXIS: 133
OD_CYLINDER: +0.50
OS_AXIS: 006
OS_CYLINDER: +0.75
OD_SPHERE: +2.25
OD_ADD: +2.00
OS_SPHERE: +3.50

## 2021-10-20 ASSESSMENT — TONOMETRY
OD_IOP_MMHG: 15
OS_IOP_MMHG: 17
IOP_METHOD: APPLANATION

## 2021-10-20 ASSESSMENT — EXTERNAL EXAM - RIGHT EYE: OD_EXAM: NORMAL

## 2021-10-20 ASSESSMENT — CONF VISUAL FIELD
OD_NORMAL: 1
OS_NORMAL: 1

## 2021-10-20 ASSESSMENT — EXTERNAL EXAM - LEFT EYE: OS_EXAM: NORMAL

## 2021-10-20 NOTE — NURSING NOTE
Chief Complaints and History of Present Illnesses   Patient presents with     Narrow Angle Glaucoma Follow Up     Chief Complaint(s) and History of Present Illness(es)     Narrow Angle Glaucoma Follow Up     Laterality: both eyes              Comments     Pt. States that he is doing well. No change in VA BE. Does have days where he feels pressure behind eyes and has headaches.   Shelby Muro COT 7:40 AM October 20, 2021

## 2021-10-20 NOTE — PROGRESS NOTES
Chief Complaint(s) and History of Present Illness(es)     Narrow Angle Glaucoma Follow Up     Laterality: both eyes              Comments     Pt. States that he is doing well. No change in VA BE. Does have days   where he feels pressure behind eyes and has headaches.   Shelby Muro COT 7:40 AM October 20, 2021               Review of systems for the eyes was negative other than the pertinent positives/negatives listed in the HPI.      Assessment & Plan      Jaun Reilly is a 55 year old male with the following diagnoses:   1. Anatomical narrow angle borderline glaucoma of both eyes    2. Dry eyes, bilateral           S/P Laser peripheral iridotomy (LPI) left eye 12/6/18 and 12/21/18 (both patent)  S/P LPI right eye 12/21/18 (patent)    Intraocular pressure good in both eyes today   maximum intraocular pressure 42/31  Stable visual field in both eyes   OCT Nerve fiber layer: remains within normal limits both eyes - stable  Gonio: Slight progression in narrowing left eye > right eye.  No significant peripheral anterior synechiae at this time    Reassurance given.  Continue current management and monitoring  Refractive options reviewed  Refraction given      - Cont latanoprost at bedtime both eyes    - Cont dorzolamide twice a day both eyes   - Return precautions reviewed         Patient disposition:   Return in about 6 months (around 4/20/2022) for OCT NFL, DFE.           Attending Physician Attestation:  Complete documentation of historical and exam elements from today's encounter can be found in the full encounter summary report (not reduplicated in this progress note).  I personally obtained the chief complaint(s) and history of present illness.  I confirmed and edited as necessary the review of systems, past medical/surgical history, family history, social history, and examination findings as documented by others; and I examined the patient myself.  I personally reviewed the relevant tests, images, and reports  as documented above.  I formulated and edited as necessary the assessment and plan and discussed the findings and management plan with the patient and family. . - Job Pinedo MD

## 2021-12-07 ENCOUNTER — TELEPHONE (OUTPATIENT)
Dept: OPHTHALMOLOGY | Facility: CLINIC | Age: 56
End: 2021-12-07
Payer: COMMERCIAL

## 2021-12-07 NOTE — TELEPHONE ENCOUNTER
[facility]    Phone Message    May a detailed message be left on voicemail: yes     Reason for Call: Other: pt want to know if we received certain recds from a different facility Dr Duarte [address] [facility] eye care, Please call pt back to discuss    Thank you,    Action Taken: Message routed to:  [facility]    Travel Screening: Not Applicable

## 2021-12-07 NOTE — TELEPHONE ENCOUNTER
Called and LVM for Philip in regards to the Ultra Sound done with Dr. Sousa. We  received all records n imaging by Dr. Sousa in Oct and michael Zamorano in the     Louann Ferguson Communication Facilitator on 12/7/2021 at 12:04 PM

## 2021-12-07 NOTE — TELEPHONE ENCOUNTER
[facility]    Phone Message    May a detailed message be left on voicemail: yes     Reason for Call: Other: Pt wanted to add that records he is inquiring about is regarding the UBM (ultrasound of eyes). Please call pt to discuss. Thank you.     Action Taken: Message routed to:  [facility]    Travel Screening: Not Applicable

## 2021-12-07 NOTE — TELEPHONE ENCOUNTER
Called and LVM for Jaun.     I LVM for Philip stating we received all of his records from Dr. Duarte. If he has any other questions he can call me directly.     Louann Ferguson Communication Facilitator on 12/7/2021 at 11:19 AM

## 2021-12-08 ENCOUNTER — TELEPHONE (OUTPATIENT)
Dept: OPHTHALMOLOGY | Facility: CLINIC | Age: 56
End: 2021-12-08
Payer: COMMERCIAL

## 2021-12-08 NOTE — TELEPHONE ENCOUNTER
Dr. Pinedo reviewed and facilitator reach out and pt is scheduled tomorrow with Dr. Pierre/Dr. Khris Ken RN 2:16 PM 12/08/21

## 2021-12-08 NOTE — TELEPHONE ENCOUNTER
Called pt twice yesterday to update him on the  records from Dr. Sousa's office  and imaging / ultrasound that was sent to us back in Oct.     Pls Review and call pt.     Thank you    Louann

## 2021-12-08 NOTE — TELEPHONE ENCOUNTER
The pt called back. He did not know he had an appt tomorrow, and that he had a bleeding cyst in his eye? He is very confued and wants a call back today. Thanks.

## 2021-12-08 NOTE — TELEPHONE ENCOUNTER
Called and LVM for Philip. I called and spoke to him yesterday in regards to notes and imaging. He was referred to [facility] per Dr. Sousa for a cyst that might be malignant. NO BLEEDING IN THE RIGHT EYE. I have left my number a couple of times but he calls the call center.     Needs an appt with Dr. Pierre.. Next available     Louann Ferguson Communication Facilitator on 12/8/2021 at 1:10 PM

## 2021-12-08 NOTE — TELEPHONE ENCOUNTER
[facility]    Phone Message    May a detailed message be left on voicemail: yes     Reason for Call: Other: Pt called and wanted me to send this exact message to Dr Pinedo: please review the materials sent from Dr Duarte's office and if there is anything different other than the usual course of action, please give me a call. Thank you.     Action Taken: Message routed to:  [facility]    Travel Screening: Not Applicable

## 2021-12-09 ENCOUNTER — OFFICE VISIT (OUTPATIENT)
Dept: OPHTHALMOLOGY | Facility: CLINIC | Age: 56
End: 2021-12-09
Attending: OPHTHALMOLOGY
Payer: COMMERCIAL

## 2021-12-09 DIAGNOSIS — H21.303: ICD-10-CM

## 2021-12-09 DIAGNOSIS — H10.45 CHRONIC ALLERGIC CONJUNCTIVITIS: ICD-10-CM

## 2021-12-09 DIAGNOSIS — H40.033 ANATOMICAL NARROW ANGLE BORDERLINE GLAUCOMA OF BOTH EYES: ICD-10-CM

## 2021-12-09 DIAGNOSIS — H04.123 DRY EYES, BILATERAL: ICD-10-CM

## 2021-12-09 DIAGNOSIS — H21.303: Primary | ICD-10-CM

## 2021-12-09 PROCEDURE — 92285 EXTERNAL OCULAR PHOTOGRAPHY: CPT | Performed by: OPHTHALMOLOGY

## 2021-12-09 PROCEDURE — 92132 CPTRZD OPH DX IMG ANT SGM: CPT | Performed by: OPHTHALMOLOGY

## 2021-12-09 PROCEDURE — 76513 OPH US DX ANT SGM US UNI/BI: CPT | Performed by: OPHTHALMOLOGY

## 2021-12-09 PROCEDURE — 99213 OFFICE O/P EST LOW 20 MIN: CPT | Mod: GC | Performed by: OPHTHALMOLOGY

## 2021-12-09 PROCEDURE — 999N000103 HC STATISTIC NO CHARGE FACILITY FEE

## 2021-12-09 PROCEDURE — 99214 OFFICE O/P EST MOD 30 MIN: CPT | Mod: GC | Performed by: OPHTHALMOLOGY

## 2021-12-09 PROCEDURE — G0463 HOSPITAL OUTPT CLINIC VISIT: HCPCS

## 2021-12-09 RX ORDER — EMTRICITABINE AND TENOFOVIR ALAFENAMIDE 200; 25 MG/1; MG/1
1 TABLET ORAL DAILY
COMMUNITY
Start: 2021-11-24

## 2021-12-09 RX ORDER — IBUPROFEN 800 MG/1
TABLET, FILM COATED ORAL
COMMUNITY
Start: 2021-11-29

## 2021-12-09 ASSESSMENT — CONF VISUAL FIELD
METHOD: COUNTING FINGERS
METHOD: COUNTING FINGERS
OD_NORMAL: 1
OS_NORMAL: 1
OS_NORMAL: 1
OD_NORMAL: 1

## 2021-12-09 ASSESSMENT — VISUAL ACUITY
CORRECTION_TYPE: GLASSES
OD_CC: 20/20
OS_CC: 20/25
METHOD: SNELLEN - LINEAR
OS_CC: 20/25
CORRECTION_TYPE: GLASSES
METHOD: SNELLEN - LINEAR
OD_CC: 20/20

## 2021-12-09 ASSESSMENT — REFRACTION_WEARINGRX
OS_SPHERE: +3.50
OS_CYLINDER: +0.75
OS_AXIS: 006
OS_SPHERE: +3.50
OD_SPHERE: +2.25
OD_ADD: +2.00
OS_ADD: +2.00
OD_SPHERE: +2.25
OD_AXIS: 133
OS_AXIS: 006
OS_ADD: +2.00
OS_CYLINDER: +0.75
OD_ADD: +2.00
OD_AXIS: 133
OD_CYLINDER: +0.50
OD_CYLINDER: +0.50

## 2021-12-09 ASSESSMENT — CUP TO DISC RATIO
OD_RATIO: 0.35
OS_RATIO: 0.35
OS_RATIO: 0.35

## 2021-12-09 ASSESSMENT — EXTERNAL EXAM - LEFT EYE
OS_EXAM: NORMAL
OS_EXAM: NORMAL

## 2021-12-09 ASSESSMENT — EXTERNAL EXAM - RIGHT EYE
OD_EXAM: NORMAL
OD_EXAM: NORMAL

## 2021-12-09 ASSESSMENT — TONOMETRY
IOP_METHOD: APPLANATION
OS_IOP_MMHG: 22
OD_IOP_MMHG: 18
IOP_METHOD: TONOPEN
OD_IOP_MMHG: 18
OS_IOP_MMHG: 18

## 2021-12-09 NOTE — PROGRESS NOTES
CC: Conj Cyst RE    Referring Provider: Rochelle Goss      HPI:    Jaun Reilly 56 year old White male referred for evaluation of right ciliary body cysts.  He denies any pain or vision problems in this eye.  Denies any trauma to the right eye.      POHx:  Last eye exam: 10/25/21 with Dr. Goss  Prior eye surgery/laser:   Laser peripheral iridotomies (2018)    CTL wearer: yes (seldom)    Glasses: yes    Family Hx of eye disease: Possible glaucoma (both parents, pt unsure)    Gtts Currenly Taking:  Dorzolamide BID each eye   Latanoprost at bedtime each eye   PFATs 3-4 times daily each eye     Allergies:  NKDA    A/P:  each eye -  Ciliary body cysts.  See - UBM    PLAN:  Imaging of cysts in each eye.   Possible relation to Descovy? [place] report on renal cysts.      Follow up [facility]:   6 months - call sooner aith any problems.      ALSO SEES:  Rochelle Pedraza.    Attending Physician Attestation:  Complete documentation of historical and exam elements from today's encounter can be found in the full encounter summary report (not reduplicated in this progress note).  I personally obtained the chief complaint(s) and history of present illness.  I confirmed and edited as necessary the review of systems, past medical/surgical history, family history, social history, and examination findings as documented by others; and I examined the patient myself.  I personally reviewed the relevant tests, images, and reports as documented above.  I formulated and edited as necessary the assessment and plan and discussed the findings and management plan with the patient and family. - Rj Pierre MD     I personally reviewed the ophthalmic test(s) associated with this encounter, agree with the interpretation(s) as documented by the resident/fellow, and have edited the corresponding report(s) as necessary. Rj Pierre MD

## 2021-12-09 NOTE — NURSING NOTE
Chief Complaints and History of Present Illnesses   Patient presents with     Glaucoma Follow Up     2 month follow up both eyes.       Chief Complaint(s) and History of Present Illness(es)     Glaucoma Follow Up     Comments: 2 month follow up both eyes.                Comments     Pt states vision is about the same as last visit 2 months ago. No eye pain today.  No flashes or floaters. No redness. Dryness in BE, relief with drops.    RUPERTO Jones December 9, 2021 8:01 AM

## 2021-12-09 NOTE — PROGRESS NOTES
Chief Complaint(s) and History of Present Illness(es)     Glaucoma Follow Up     Comments: 2 month follow up both eyes.                Comments     Pt states vision is about the same as last visit 2 months ago. No eye   pain today.  No flashes or floaters. No redness. Dryness in BE, relief with drops.    RUPERTO Jones December 9, 2021 8:01 AM            Review of systems for the eyes was negative other than the pertinent positives/negatives listed in the HPI.      Assessment & Plan      Jaun Reilly is a 56 year old male with the following diagnoses:   1. Ciliary body cyst, bilateral    2. Anatomical narrow angle borderline glaucoma of both eyes    3. Chronic allergic conjunctivitis    4. Dry eyes, bilateral         Referred back by Dr. Duarte for identified CB cysts.  Asymptomatic, found on UBM  Seen by Dr. Pierre today, no concerning findings.  Observation recommended      S/P Laser peripheral iridotomy (LPI) left eye 12/6/18 and 12/21/18 (both patent)  S/P LPI right eye 12/21/18 (patent)    Intraocular pressure remains good in both eyes today   Low suspicion for intermittent closure at this time  Plans to get new glasses from last prescription   Maximum intraocular pressure 42/31    Reassurance given.  Continue current management and monitoring     - Cont latanoprost at bedtime both eyes    - Cont dorzolamide twice a day both eyes   - Return precautions reviewed       Patient disposition:   Return for OCT NFL, Gonio with physician prior to dilation.      Kuldip Wilder M.D.  Ophthalmology Resident - PGY-4    Attending Physician Attestation:  Complete documentation of historical and exam elements from today's encounter can be found in the full encounter summary report (not reduplicated in this progress note).  I personally obtained the chief complaint(s) and history of present illness.  I confirmed and edited as necessary the review of systems, past medical/surgical history, family history, social  history, and examination findings as documented by others; and I examined the patient myself.  I personally reviewed the relevant tests, images, and reports as documented above.  I formulated and edited as necessary the assessment and plan and discussed the findings and management plan with the patient and family. . - Job Pinedo MD

## 2021-12-09 NOTE — NURSING NOTE
Chief Complaints and History of Present Illnesses   Patient presents with     Dry Eye(s)     Chief Complaint(s) and History of Present Illness(es)     Dry Eye(s)               Comments     Pt states vision is about the same as last visit 2 months ago. No eye pain today.  No flashes or floaters. No redness. Dryness in BE, relief with drops.    RUPERTO Jones December 9, 2021 8:01 AM

## 2022-02-12 ENCOUNTER — HEALTH MAINTENANCE LETTER (OUTPATIENT)
Age: 57
End: 2022-02-12

## 2022-02-17 ENCOUNTER — TELEPHONE (OUTPATIENT)
Dept: OPHTHALMOLOGY | Facility: CLINIC | Age: 57
End: 2022-02-17
Payer: COMMERCIAL

## 2022-02-17 NOTE — TELEPHONE ENCOUNTER
Reviewed with Dr. Pinedo    Scheduled in Acute clinic on Tuesday when Dr. Pinedo staffing and pt aware of date/time/location    Jluis Ken RN 12:46 PM 02/17/22        Jaun Reilly [phone]        Spoke to pt at 0810    Bilateral flashing couple times while looking out window in past couple weeks and and couple floaters each noticed in past couple weeks    Offered appt with Dr. Klein today and pt unable and wishing to see Dr. Pinedo to review/address other eye concerns-- dryness and having trouble keeping up on eye drops--even lubricating eye drops during day and stinging with drops.    Will review scheduling options with Dr. Olson call pt back.    Jluis Ken RN 8:14 AM 02/17/22         [facility] Call Center    Phone Message    May a detailed message be left on voicemail: yes     Reason for Call: Symptoms or Concerns     If patient has red-flag symptoms, warm transfer to triage line    Current symptom or concern: Pt has experienced some flashers over the last couple weeks, about 2-3 episodes. Also have trouble with the eyedrops and eyes burning.    Symptoms have been present for:  2 week(s)    Has patient previously been seen for this? Yes    By : Dr. Pinedo    Date: 12/9/21    Are there any new or worsening symptoms? No      Action Taken: Message routed to:  [facility]    Travel Screening: Not Applicable

## 2022-02-22 ENCOUNTER — OFFICE VISIT (OUTPATIENT)
Dept: OPHTHALMOLOGY | Facility: CLINIC | Age: 57
End: 2022-02-22
Attending: OPHTHALMOLOGY
Payer: COMMERCIAL

## 2022-02-22 DIAGNOSIS — H10.45 CHRONIC ALLERGIC CONJUNCTIVITIS: ICD-10-CM

## 2022-02-22 DIAGNOSIS — H21.303: ICD-10-CM

## 2022-02-22 DIAGNOSIS — H40.033 ANATOMICAL NARROW ANGLE BORDERLINE GLAUCOMA OF BOTH EYES: Primary | ICD-10-CM

## 2022-02-22 PROCEDURE — 99213 OFFICE O/P EST LOW 20 MIN: CPT | Mod: GC | Performed by: STUDENT IN AN ORGANIZED HEALTH CARE EDUCATION/TRAINING PROGRAM

## 2022-02-22 PROCEDURE — G0463 HOSPITAL OUTPT CLINIC VISIT: HCPCS

## 2022-02-22 ASSESSMENT — CONF VISUAL FIELD
OS_NORMAL: 1
OD_NORMAL: 1

## 2022-02-22 ASSESSMENT — EXTERNAL EXAM - RIGHT EYE: OD_EXAM: NORMAL

## 2022-02-22 ASSESSMENT — REFRACTION_WEARINGRX
OS_CYLINDER: +0.75
OS_ADD: +2.00
OS_AXIS: 006
OD_ADD: +2.00
OD_SPHERE: +2.25
OD_AXIS: 133
OD_CYLINDER: +0.50
OS_SPHERE: +3.50

## 2022-02-22 ASSESSMENT — VISUAL ACUITY
CORRECTION_TYPE: GLASSES
OD_CC: 20/20
METHOD: SNELLEN - LINEAR
OS_CC: 20/25

## 2022-02-22 ASSESSMENT — TONOMETRY
OS_IOP_MMHG: 16
OD_IOP_MMHG: 16
IOP_METHOD: ICARE

## 2022-02-22 ASSESSMENT — CUP TO DISC RATIO
OS_RATIO: 0.35
OD_RATIO: 0.35

## 2022-02-22 ASSESSMENT — EXTERNAL EXAM - LEFT EYE: OS_EXAM: NORMAL

## 2022-02-22 NOTE — NURSING NOTE
Chief Complaints and History of Present Illnesses   Patient presents with     Flashes Evaluation     Chief Complaint(s) and History of Present Illness(es)     Flashes Evaluation     Laterality: both eyes    Duration: 3 weeks              Comments     Pt. States that he started seeing a floater 3 weeks ago. Lasted about 10 seconds as then went away. Did have one episode of flashing as well. No pain BE.   Shelby Muro COT 9:54 AM February 22, 2022

## 2022-02-22 NOTE — PROGRESS NOTES
Chief Complaint(s) and History of Present Illness(es)     Flashes Evaluation     In both eyes.  Duration of 3 weeks.              Comments     Pt. States that he started seeing a floater 3 weeks ago. Lasted about 10 seconds as then went away. Did have one episode of flashing as well. No pain BE.   Shelby Muro COT 9:54 AM February 22, 2022                      Review of systems for the eyes was negative other than the pertinent positives/negatives listed in the HPI.      Assessment & Plan      Jaun Reilly is a 56 year old male with the following diagnoses:   1. Anatomical narrow angle borderline glaucoma of both eyes    2. Chronic allergic conjunctivitis    3. Ciliary body cyst, bilateral         S/P Laser peripheral iridotomy (LPI) left eye 12/6/18 and 12/21/18 (both patent)    Intraocular pressure today 16/16 each eye, improved from last visit  Had several questions about interactions between latanoprost/dorzolamide and descovy. We discussed that we were not aware of any interactions between those drugs.   No evidence of posterior vitreous detachment either eye, retinal tear or detachment either eye today. Given one time episode of flashes that lasted 15 seconds and went away spontaneously low concern for actual retinal tear and he is fairly hyperopic which in this case is protective.     Reassurance given.  Continue current management and monitoring     - Cont latanoprost at bedtime both eyes    - Cont dorzolamide twice a day both eyes   - Return precautions reviewed       Patient disposition:   Return in about 4 months (around 6/22/2022).    Sabina Banda MD  Resident Physician, PGY-2  Department of Ophthalmology  02/22/22 10:53 AM      Attending Physician Attestation:  Complete documentation of historical and exam elements from today's encounter can be found in the full encounter summary report (not reduplicated in this progress note).  I personally obtained the chief complaint(s) and history of present  illness.  I confirmed and edited as necessary the review of systems, past medical/surgical history, family history, social history, and examination findings as documented by others; and I examined the patient myself.  I personally reviewed the relevant tests, images, and reports as documented above.  I formulated and edited as necessary the assessment and plan and discussed the findings and management plan with the patient and family. I spent a total of 30 minutes face to face with the patient.  Over 50% of this time was spent counseling and coordinating care regarding their diagnosis and management.   . - Job Pinedo MD

## 2022-03-08 ENCOUNTER — TELEPHONE (OUTPATIENT)
Dept: OPHTHALMOLOGY | Facility: CLINIC | Age: 57
End: 2022-03-08
Payer: COMMERCIAL

## 2022-03-08 NOTE — TELEPHONE ENCOUNTER
Reviewed by Dr. Pinedo and recommends Benedryl on for emergencies.    Call pt at [phone] and left message with information and direct number for any further assistance/questions    Jluis Ken RN 9:28 AM 03/09/22           [facility] Call [facility]    Phone Message    May a detailed message be left on voicemail: yes     Reason for Call: Other: Pt wants to know if he can take Benedryl with his current medications or if that will interfere with his Glaucoma. Please call to discuss. Thank you.      Action Taken: Message routed to:  [facility]    Travel Screening: Not Applicable

## 2022-03-16 ENCOUNTER — TELEPHONE (OUTPATIENT)
Dept: OPHTHALMOLOGY | Facility: CLINIC | Age: 57
End: 2022-03-16
Payer: COMMERCIAL

## 2022-03-16 NOTE — TELEPHONE ENCOUNTER
[facility]    Phone Message    May a detailed message be left on voicemail: yes     Reason for Call: Other:  Pt is requesting to discuss a new medication from PCP, Pt is concerned how this new medication could effect his eyes, per Pt.  Pt requested writer to not name the medication in this message, per Pt.  Please call Pt back to discuss.    Thank you.    Action Taken: Message routed to:  [facility]    Travel Screening: Not Applicable

## 2022-03-16 NOTE — TELEPHONE ENCOUNTER
[facility]    Phone Message    May a detailed message be left on voicemail: yes     Reason for Call: Other: Pt called and would like Clarissa to call him back  he just got off the phone with you,    Thank you,    Action Taken: Message routed to:  [facility]    Travel Screening: Not Applicable

## 2022-03-16 NOTE — TELEPHONE ENCOUNTER
"Called him back.    He wanted to make sure Dr. Pinedo knows the trazodone is the lowest dose and he will only be on it for a \"short time\".     He'd like Dr. Pinedo to call to discuss.    Clarissa Pink, COT 11:39 AM 03/16/2022      "

## 2022-03-16 NOTE — TELEPHONE ENCOUNTER
Spoke with patient, his PCP is writing rx for trazodone 50mg to use for sleep.  He had been using tylenol PM and then switched to Benadryl only, but was told not to use the benadryl regularly by Dr. Pinedo    He is wondering if the trazodone is safe for him to use.  He also has questions regarding the severity of his glaucoma and would like Dr. Pinedo to call him to discuss.     Clarissa Pink, COT 11:24 AM 03/16/2022

## 2022-03-17 NOTE — TELEPHONE ENCOUNTER
I spoke with patient about concern regarding trazadone given anatomically narrow angles. I notified patient that given the previous treatment with the laser peripheral iridotomy there was a very low risk of developing acute angle closure glaucoma with trazadone. Patient will relay message to PCP. I also gave pt our clinic phone number so that his PCP could call us with any questions.     Sabina Banda MD  Resident Physician, PGY-2  Department of Ophthalmology  03/16/22 8:12 PM

## 2022-03-18 ENCOUNTER — TELEPHONE (OUTPATIENT)
Dept: OPHTHALMOLOGY | Facility: CLINIC | Age: 57
End: 2022-03-18
Payer: COMMERCIAL

## 2022-03-18 NOTE — TELEPHONE ENCOUNTER
Has narrow angles, but no evidence of glaucoma.  Being treated and followed as a glaucoma suspect     Spoke to pt at 0952 and reviewed information above per Dr. Pinedo    Pt verbally demonstrated understanding    Jluis Ken RN 9:52 AM 03/22/22              [facility]    Phone Message    May a detailed message be left on voicemail: yes     Reason for Call: Other: Pt is wondering if he has a confirmed diagnosis of glaucoma. He needs to know this when filling out certain health information forms. Please call pt to discuss. Thank you.     Action Taken: Message routed to:  [facility]    Travel Screening: Not Applicable

## 2022-03-25 ENCOUNTER — TELEPHONE (OUTPATIENT)
Dept: OPHTHALMOLOGY | Facility: CLINIC | Age: 57
End: 2022-03-25
Payer: COMMERCIAL

## 2022-03-25 NOTE — TELEPHONE ENCOUNTER
[facility]    Phone Message    May a detailed message be left on voicemail: yes     Reason for Call: Other: Pt wants to know Lunesta ot Hydroxyzine are okay for him to take. Please call to discuss. Thank you     Action Taken: Message routed to:  [facility]    Travel Screening: Not Applicable

## 2022-03-28 NOTE — TELEPHONE ENCOUNTER
I called patient back and informed him that from an eye perspective it is okay to take these medications if needed.     Sabina Banda MD

## 2022-04-18 ENCOUNTER — TELEPHONE (OUTPATIENT)
Dept: UROLOGY | Facility: CLINIC | Age: 57
End: 2022-04-18
Payer: COMMERCIAL

## 2022-04-18 NOTE — TELEPHONE ENCOUNTER
Patient is aware clinic is trying to reach [facility] to have images pushes.    Claudia Small LPN on 4/18/2022 at 3:19 PM

## 2022-04-18 NOTE — TELEPHONE ENCOUNTER
Health Call Center    Phone Message    May a detailed message be left on voicemail: yes     Reason for Call: Patient has an in clinic appt tomorrow and would like for clinic to confirm that the CT from [place] will be available for Dr. Ledbetter.  Please reach out to patient.    Action Taken: Message routed to:  [facility] Urology    Travel Screening: Not Applicable

## 2022-04-19 ENCOUNTER — OFFICE VISIT (OUTPATIENT)
Dept: UROLOGY | Facility: CLINIC | Age: 57
End: 2022-04-19
Payer: COMMERCIAL

## 2022-04-19 ENCOUNTER — TELEPHONE (OUTPATIENT)
Dept: UROLOGY | Facility: CLINIC | Age: 57
End: 2022-04-19

## 2022-04-19 DIAGNOSIS — N20.0 KIDNEY STONE ON LEFT SIDE: Primary | ICD-10-CM

## 2022-04-19 DIAGNOSIS — Z11.59 ENCOUNTER FOR SCREENING FOR OTHER VIRAL DISEASES: Primary | ICD-10-CM

## 2022-04-19 PROCEDURE — 99204 OFFICE O/P NEW MOD 45 MIN: CPT | Performed by: UROLOGY

## 2022-04-19 NOTE — NURSING NOTE
Jaun Reilly's goals for this visit include:   Chief Complaint   Patient presents with     New Patient     Kidney stones-would like 2nd opinion       He requests these members of his care team be copied on today's visit information:     PCP: Oj Mathis    Referring Provider:  Referred Self, MD  No address on file    There were no vitals taken for this visit.    Do you need any medication refills at today's visit?     Claudia Small LPN on 4/19/2022 at 8:08 AM

## 2022-04-19 NOTE — PROGRESS NOTES
Urology Consult History and Physical  SAGRARIO MORALES   Name: Jaun Reilly    MRN: [id number]   YOB: 1965       We were asked to see Jaun Reilly at the request of SELF for evaluation and treatment of LEFT kidney stones.        Chief Complaint:   LEFT kidney stones    History is obtained from the patient            History of Present Illness:   Jaun Reilly is a 56 year old male who is being seen for evaluation of LEFT kidney stones  Previously followed with Dr. Figueroa  He passed a LEFT stone in November  Also noted to have additional kidney stones    He has been having night sweats since November  Has been on on Descovy for HIV prevention which he has now stopped    He does have significant anxiety  He notes his PCP has done extensive work-up for the night sweats with no identifiable cause  He wonders if the remaining left lower pole kidney stones could be contributing    PCP is Dr. Dumont           Past Medical History:     Past Medical History:   Diagnosis Date     Diabetes (H)     trouble controlling blood sugar levels.     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 2007     Palpitations     WPW     PONV (postoperative nausea and vomiting)      Varicella without mention of complication      Natalie-Parkinson-White (WPW) syndrome             Past Surgical History:     Past Surgical History:   Procedure Laterality Date     COLONOSCOPY N/A 8/27/2015    Procedure: COMBINED COLONOSCOPY, SINGLE OR MULTIPLE BIOPSY/POLYPECTOMY BY BIOPSY;  Surgeon: Chris You MD;  Location: [facility]     HC TRABECULOPLASTY BY LASER SURGERY      LPI     HERNIA REPAIR       LAPAROSCOPY DIAGNOSTIC (GENERAL) Right 4/15/2016    Procedure: LAPAROSCOPY DIAGNOSTIC (GENERAL);  Surgeon: Jhonatan Barragan MD;  Location:  OR     NO HISTORY OF SURGERY              Social History:     Social History     Tobacco Use     Smoking status: Never  Smoker     Smokeless tobacco: Never Used   Substance Use Topics     Alcohol use: Yes     Comment: rare social wine       History   Smoking Status     Never Smoker   Smokeless Tobacco     Never Used            Family History:     Family History   Problem Relation Age of Onset     Alcohol/Drug Mother         recovering alcoholic     Hypertension Mother      Cerebrovascular Disease Mother         age 63 fully recovered      Diabetes Mother      Alcohol/Drug Father      Diabetes Father         diabetes type II  late 40's      Hypertension Father      Thyroid Disease Father      Glaucoma No family hx of      Macular Degeneration No family hx of               Allergies:     Allergies   Allergen Reactions     No Known Allergies      Seasonal Allergies             Medications:     Current Outpatient Medications   Medication Sig     Cholecalciferol (VITAMIN D-3 PO)      COD LIVER OIL PO Take 1 tablet by mouth daily.     DESCOVY 200-25 MG per tablet Take 1 tablet by mouth daily     dorzolamide (TRUSOPT) 2 % ophthalmic solution Place 1 drop into both eyes 2 times daily     ibuprofen (ADVIL/MOTRIN) 800 MG tablet ibuprofen 800 mg tablet     latanoprost (XALATAN) 0.005 % ophthalmic solution Place 1 drop into both eyes At Bedtime     Multiple Vitamin (DAILY MULTIVITAMIN PO) Take 1 tablet by mouth daily.     SELENIUM PO Take 1 capsule by mouth     VITAMIN E PO Take 1-2 tablets by mouth daily.     No current facility-administered medications for this visit.             Review of Systems:     Skin: negative  Eyes: negative  Ears/Nose/Throat: negative  Respiratory: No shortness of breath, dyspnea on exertion, cough, or hemoptysis  Cardiovascular: negative  Gastrointestinal: negative  Genitourinary: as above  Musculoskeletal: negative  Neurologic: negative  Psychiatric: negative  Hematologic/Lymphatic/Immunologic: negative  Endocrine: negative          Physical Exam:   No data found.  There is no height or weight on file to calculate  BMI.     General: age-appropriate appearing male in NAD  HEENT: Head AT/NC, EOMI, CN Grossly intact  Lungs: no respiratory distress, or pursed lip breathing  Heart: No obvious jugular venous distension present  Back: no bony midline tenderness, no CVAT bilaterally.  Abdomen: soft, non-distended, non-tender. No organomegaly  Lymph: no palpable inguinal lymphadenopathy.  LE: no edema.   Musculoskeltal: extremities normal, no peripheral edema  Skin: no suspicious lesions or rashes  Neuro: Alert, oriented, speech and mentation normal;  moving all 4 extremities equally.  Psych: affect and mood normal          Data:   All laboratory data reviewed:    UA RESULTS:  Recent Labs   Lab Test 06/03/16  1009   COLOR Rukhsana   APPEARANCE Cloudy   URINEGLC Negative   URINEBILI Negative   URINEKETONE Negative   SG 1.018   UBLD Negative   URINEPH 7.0   PROTEIN Negative   NITRITE Negative   LEUKEST Negative   RBCU <1   WBCU 1      PSA   Date Value Ref Range Status   06/03/2016 0.44 0 - 4 ug/L Final      Lab Results   Component Value Date    CR 0.84 10/24/2009      IMAGING:  All pertinent imaging reviewed:    All imaging studies reviewed by me.  I personally reviewed these imaging films.  A formal report from radiology will follow.    CT ABD/PEL 2/28/22:  Findings:    Normal caliber thoracic aorta. Heart size is normal. No pericardial effusion. No mediastinal or hilar adenopathy.   Lungs appear clear. No effusion.   No liver lesions. Spleen adrenal glands, pancreas gallbladder unremarkable. Normal caliber abdominal aorta.   Nonobstructing left renal calculi. Kidneys are unremarkable. Large amount stool within the colon. Bowel appears unremarkable. No adenopathy.     Prostate slightly prominent.     Urinary bladder unremarkable. Bilateral small fat containing inguinal hernias. Bowel slightly protrudes into the hernia opening.     No suspicious bony lesions.     Impression:    1. No acute findings in the chest abdomen and pelvis.   2.  Nonobstructing left renal calculi.    3. Slight prominent prostate gland.                Impression and Plan:   Impression:   56-year-old man with left lower pole kidney stones      Plan:   Left lower pole kidney stones  - I reviewed his labs which are notable for normal serum creatinine  - I reviewed his CT reports and images available from November  - I measured the stone at 1.2 cm with a Hounsfield units of roughly 1400  -- We discussed treatment options which include:  ---- ESWL: We discussed that given the location of the stone in the lower pole we typically use a cutoff of size of 1 cm, as well as a Hounsfield units of ideally less than 1000.  Given that his stone is greater in size and more dense I would not recommend ESWL  ---- URETEROSCOPY: we discussed that there would be 10% chance of requiring a staged procedure. We discussed the need for a ureteral stent.  ---- PCNL: We discussed that this treatment option is indicated for stones greater than 1.5 to 2 cm  -he would like to proceed with ureteroscopy  - We did discuss that these lower pole stones are unlikely to be contributing to his intermittent night sweats and that he should not anticipate a change in this symptom after the stone surgery  -Order for surgery was placed     Thank you for the kind consultation.    Time spent: 30 minutes spent on the date of the encounter doing chart review, history and exam, documentation and further activities as noted above.    Marco Ledbetter MD   Urology  [facility]  [facility] Phone: [phone]  [facility] Phone: [phone]

## 2022-04-19 NOTE — CONFIDENTIAL NOTE
Called patient and he had a lot of questions still regarding surgery. He was wondering if he will need to be resting with stent or if he could resume normal activity such as working out, running/walking, etc. PT was also wondering how the procedure is completed (cutting open, insert instrument, etc). PT would like a phone call from the surgeon.    PT is worried he will not have a ride home. He asked if he could use Uber but writer explained PT would need a medical ride and not Uber.     Surgery scheduled for 5/10.    H&P with PCP.    COVID test scheduled for 5/6.    Post-op scheduled for 5/17 for stent removal.    PT would like a surgery packet mailed.     Sent message to RN and surgeon with patient questions.

## 2022-04-20 ENCOUNTER — TELEPHONE (OUTPATIENT)
Dept: UROLOGY | Facility: CLINIC | Age: 57
End: 2022-04-20
Payer: COMMERCIAL

## 2022-04-20 NOTE — TELEPHONE ENCOUNTER
Shannan Lopes; Marco Ledbteter MD; [facility] Adult [place] 55 minutes ago (12:13 PM)     JM    I cancelled surgery and appointments.     Thank you,   Zena       Received the above message from surgery scheduler. Will close encounter at this time.    Cony Hwang RN, BSN

## 2022-04-20 NOTE — TELEPHONE ENCOUNTER
Per patient, he would like to cancel surgery on 5/10 scheduled with Dr. Ledbetter until he is ready to move forward. He will call the clinic when he is ready.    Cancelled appointments and surgery.

## 2022-06-07 ENCOUNTER — OFFICE VISIT (OUTPATIENT)
Dept: OPHTHALMOLOGY | Facility: CLINIC | Age: 57
End: 2022-06-07
Attending: FAMILY MEDICINE
Payer: COMMERCIAL

## 2022-06-07 DIAGNOSIS — H21.301: ICD-10-CM

## 2022-06-07 DIAGNOSIS — H21.303: Primary | ICD-10-CM

## 2022-06-07 PROCEDURE — 99214 OFFICE O/P EST MOD 30 MIN: CPT | Mod: GC | Performed by: OPHTHALMOLOGY

## 2022-06-07 PROCEDURE — G0463 HOSPITAL OUTPT CLINIC VISIT: HCPCS

## 2022-06-07 PROCEDURE — 76513 OPH US DX ANT SGM US UNI/BI: CPT | Performed by: OPHTHALMOLOGY

## 2022-06-07 ASSESSMENT — TONOMETRY
OS_IOP_MMHG: 17
IOP_METHOD: ICARE
OD_IOP_MMHG: 15

## 2022-06-07 ASSESSMENT — VISUAL ACUITY
OS_CC: 20/25
OD_CC: 20/20
CORRECTION_TYPE: GLASSES
METHOD: SNELLEN - LINEAR

## 2022-06-07 ASSESSMENT — REFRACTION_WEARINGRX
OD_SPHERE: +2.25
OS_AXIS: 006
OS_SPHERE: +3.50
OD_CYLINDER: +0.50
OD_AXIS: 133
OS_CYLINDER: +0.75
OD_ADD: +2.00
OS_ADD: +2.00

## 2022-06-07 ASSESSMENT — EXTERNAL EXAM - RIGHT EYE: OD_EXAM: NORMAL

## 2022-06-07 ASSESSMENT — CONF VISUAL FIELD
OD_NORMAL: 1
OS_NORMAL: 1

## 2022-06-07 ASSESSMENT — CUP TO DISC RATIO: OS_RATIO: 0.35

## 2022-06-07 ASSESSMENT — EXTERNAL EXAM - LEFT EYE: OS_EXAM: NORMAL

## 2022-06-07 NOTE — NURSING NOTE
Chief Complaints and History of Present Illnesses   Patient presents with     Cyst Iris/ciliary Body Follow Up     Chief Complaint(s) and History of Present Illness(es)     Cyst Iris/ciliary Body Follow Up     Laterality: both eyes              Comments     Pt. States that he is doing well. No change in distance vision BE. Has been noticing more issues with focusing while reading. No pain BE, occasional pressure sensation and quick sharp pains.   Shelby Muro COT 8:24 AM June 7, 2022

## 2022-06-07 NOTE — PROGRESS NOTES
CC: Conj Cyst each eye.    Referring Provider: Rochelle Goss      HPI:    Jaun Reilly 56 year old White male referred for evaluation of right ciliary body cysts.  He denies any pain or vision problems in this eye.  Denies any trauma to the right eye.      POHx:  Last eye exam: 10/25/21 with Dr. Goss  Prior eye surgery/laser:   Laser peripheral iridotomies (2018)    CTL wearer: yes (seldom)    Glasses: yes    Family Hx of eye disease: Possible glaucoma (both parents, pt unsure)    Gtts Currenly Taking:  Dorzolamide BID each eye   Latanoprost at bedtime each eye   PFATs 3-4 times daily each eye     Allergies:  NKDA    Assessment:    # Ciliary body cysts each eye. - right eye cysts smaller. left eye cysts : none at 3 OC and new at 6 OC (small).  - doing well.  - looks same as last visit.    PLAN:  Imaging of cysts in each eye.   Possible relation to Descovy? [place] report on renal cysts.      Follow up Cornea:  12 months, w/ VT, UBM OU  - call sooner with any problems.      ALSO SEES:  Rochelle Pedraza.    Rj naik MD    Attending Physician Attestation:  Complete documentation of historical and exam elements from today's encounter can be found in the full encounter summary report (not reduplicated in this progress note).  I personally obtained the chief complaint(s) and history of present illness.  I confirmed and edited as necessary the review of systems, past medical/surgical history, family history, social history, and examination findings as documented by others; and I examined the patient myself.  I personally reviewed the relevant tests, images, and reports as documented above.  I formulated and edited as necessary the assessment and plan and discussed the findings and management plan with the patient and family. - Rj Naik MD

## 2022-06-13 DIAGNOSIS — H40.033 ANATOMICAL NARROW ANGLE BORDERLINE GLAUCOMA OF BOTH EYES: Primary | ICD-10-CM

## 2022-06-15 ENCOUNTER — OFFICE VISIT (OUTPATIENT)
Dept: OPHTHALMOLOGY | Facility: CLINIC | Age: 57
End: 2022-06-15
Attending: OPHTHALMOLOGY
Payer: COMMERCIAL

## 2022-06-15 DIAGNOSIS — H04.123 DRY EYES, BILATERAL: ICD-10-CM

## 2022-06-15 DIAGNOSIS — H40.033 ANATOMICAL NARROW ANGLE BORDERLINE GLAUCOMA OF BOTH EYES: Primary | ICD-10-CM

## 2022-06-15 DIAGNOSIS — H10.45 CHRONIC ALLERGIC CONJUNCTIVITIS: ICD-10-CM

## 2022-06-15 DIAGNOSIS — H21.303: ICD-10-CM

## 2022-06-15 PROCEDURE — 92133 CPTRZD OPH DX IMG PST SGM ON: CPT | Performed by: OPHTHALMOLOGY

## 2022-06-15 PROCEDURE — G0463 HOSPITAL OUTPT CLINIC VISIT: HCPCS | Mod: 25

## 2022-06-15 PROCEDURE — 99214 OFFICE O/P EST MOD 30 MIN: CPT | Performed by: OPHTHALMOLOGY

## 2022-06-15 RX ORDER — DORZOLAMIDE HCL 20 MG/ML
1 SOLUTION/ DROPS OPHTHALMIC
Qty: 10 ML | Refills: 11 | Status: SHIPPED | OUTPATIENT
Start: 2022-06-15 | End: 2022-08-30

## 2022-06-15 RX ORDER — LATANOPROST 50 UG/ML
1 SOLUTION/ DROPS OPHTHALMIC AT BEDTIME
Qty: 2.5 ML | Refills: 11 | Status: SHIPPED | OUTPATIENT
Start: 2022-06-15 | End: 2022-08-30

## 2022-06-15 ASSESSMENT — VISUAL ACUITY
OD_CC+: -3
METHOD: SNELLEN - LINEAR
OD_CC: 20/25
OS_CC: 20/25
CORRECTION_TYPE: GLASSES
OS_CC+: -1

## 2022-06-15 ASSESSMENT — CONF VISUAL FIELD
OS_NORMAL: 1
METHOD: COUNTING FINGERS
OD_NORMAL: 1

## 2022-06-15 ASSESSMENT — EXTERNAL EXAM - LEFT EYE: OS_EXAM: NORMAL

## 2022-06-15 ASSESSMENT — TONOMETRY
IOP_METHOD: TONOPEN
OD_IOP_MMHG: 15
OS_IOP_MMHG: 15

## 2022-06-15 ASSESSMENT — EXTERNAL EXAM - RIGHT EYE: OD_EXAM: NORMAL

## 2022-06-15 NOTE — NURSING NOTE
Chief Complaints and History of Present Illnesses   Patient presents with     Narrow Angle Glaucoma Follow Up     Chief Complaint(s) and History of Present Illness(es)     Narrow Angle Glaucoma Follow Up     Laterality: both eyes    Quality: difficult to focus    Associated symptoms: floaters.  Negative for eye pain, headache and flashes              Comments     Since last visit, his distance vision is doing okay. He feels that his near vision is gradually decreasing making it more difficult to focus. Compliant with glaucoma drops.    Latanoprost at bedtime both eyes  Dorzolamide twice daily both eyes    [place] COT 8:50 AM Nhung 15, 2022

## 2022-06-15 NOTE — PROGRESS NOTES
Chief Complaint(s) and History of Present Illness(es)     Narrow Angle Glaucoma Follow Up     Laterality: both eyes    Quality: difficult to focus    Associated symptoms: floaters.  Negative for eye pain, headache and   flashes              Comments     Since last visit, his distance vision is doing okay. He feels that his   near vision is gradually decreasing making it more difficult to focus.   Compliant with glaucoma drops.    Latanoprost at bedtime both eyes  Dorzolamide twice daily both eyes    [place] COT 8:50 AM Nhung 15, 2022               Review of systems for the eyes was negative other than the pertinent positives/negatives listed in the HPI.      Assessment & Plan      Jaun Reilly is a 56 year old male with the following diagnoses:   1. Anatomical narrow angle borderline glaucoma of both eyes    2. Ciliary body cyst, bilateral    3. Chronic allergic conjunctivitis    4. Dry eyes, bilateral         Found to have incidental CB cysts 2021  Stable to improved today on repeat UBM.  No concerning findings.    Observation recommended    S/P Laser peripheral iridotomy (LPI) left eye 12/6/18 and 12/21/18 (both patent)  S/P LPI right eye 12/21/18 (patent)    Maximum intraocular pressure 42/31  Intraocular pressure remains good in both eyes today   Current glasses ~4 years old  Has had difficult adjusting to glasses prescription in the past  Last dilated fundus exam 2/2022     - Cont latanoprost at bedtime both eyes    - Cont dorzolamide twice a day both eyes   - Recommend eval with Dr. Green for refractive update   - Return precautions reviewed       Patient disposition:   Peter next available  Return in about 6 months (around 12/15/2022) for DFE, 24-2 Dynamic VF.           Attending Physician Attestation:  Complete documentation of historical and exam elements from today's encounter can be found in the full encounter summary report (not reduplicated in this progress note).  I personally obtained the chief  complaint(s) and history of present illness.  I confirmed and edited as necessary the review of systems, past medical/surgical history, family history, social history, and examination findings as documented by others; and I examined the patient myself.  I personally reviewed the relevant tests, images, and reports as documented above.  I formulated and edited as necessary the assessment and plan and discussed the findings and management plan with the patient and family. . - Job iPnedo MD

## 2022-06-21 ENCOUNTER — TELEPHONE (OUTPATIENT)
Dept: OPHTHALMOLOGY | Facility: CLINIC | Age: 57
End: 2022-06-21
Payer: COMMERCIAL

## 2022-06-21 NOTE — TELEPHONE ENCOUNTER
LVM for patient regarding Referral and scheduling a NEW Low Vision Eval with Jackson Green, OD-Per . Provided Eye Clinic number for scheduling needs.

## 2022-06-23 ENCOUNTER — TELEPHONE (OUTPATIENT)
Dept: OPHTHALMOLOGY | Facility: CLINIC | Age: 57
End: 2022-06-23

## 2022-06-23 NOTE — TELEPHONE ENCOUNTER
Spoke with patient regarding Referral and scheduling a NEW Low Vision Eval with Jackson Green, OD-Per .. Patient declined scheduling and is aware he can call if he changes his mind-Per Patient

## 2022-08-30 ENCOUNTER — TELEPHONE (OUTPATIENT)
Dept: OPHTHALMOLOGY | Facility: CLINIC | Age: 57
End: 2022-08-30

## 2022-08-30 DIAGNOSIS — H40.033 ANATOMICAL NARROW ANGLE BORDERLINE GLAUCOMA OF BOTH EYES: ICD-10-CM

## 2022-08-30 RX ORDER — DORZOLAMIDE HCL 20 MG/ML
1 SOLUTION/ DROPS OPHTHALMIC
Qty: 10 ML | Refills: 11 | Status: SHIPPED | OUTPATIENT
Start: 2022-08-30 | End: 2023-06-14

## 2022-08-30 RX ORDER — LATANOPROST 50 UG/ML
1 SOLUTION/ DROPS OPHTHALMIC AT BEDTIME
Qty: 2.5 ML | Refills: 11 | Status: SHIPPED | OUTPATIENT
Start: 2022-08-30 | End: 2023-06-14

## 2022-08-30 NOTE — TELEPHONE ENCOUNTER
Rx's for dorzolamide and latanoprost sent to pt's updated pharmacy    Jluis Ken RN 3:38 PM 08/30/22          [facility] Call Center    Phone Message    May a detailed message be left on voicemail: yes     Reason for Call: Other:   Pt calling to inform clinic that moving forward,  he would like his rx Dorzolamide, and latanoprost to be sent over to new pharmacy:   [facility] [address], [place] [address]     Please call with questions/concerns.     Action Taken: Other:  eye     Travel Screening: Not Applicable

## 2022-10-09 ENCOUNTER — HEALTH MAINTENANCE LETTER (OUTPATIENT)
Age: 57
End: 2022-10-09

## 2022-12-13 DIAGNOSIS — H40.033 ANATOMICAL NARROW ANGLE BORDERLINE GLAUCOMA OF BOTH EYES: Primary | ICD-10-CM

## 2022-12-14 ENCOUNTER — OFFICE VISIT (OUTPATIENT)
Dept: OPHTHALMOLOGY | Facility: CLINIC | Age: 57
End: 2022-12-14
Attending: OPHTHALMOLOGY
Payer: COMMERCIAL

## 2022-12-14 DIAGNOSIS — H40.033 ANATOMICAL NARROW ANGLE BORDERLINE GLAUCOMA OF BOTH EYES: ICD-10-CM

## 2022-12-14 DIAGNOSIS — H21.303: Primary | ICD-10-CM

## 2022-12-14 DIAGNOSIS — H04.123 DRY EYES, BILATERAL: ICD-10-CM

## 2022-12-14 PROCEDURE — 92133 CPTRZD OPH DX IMG PST SGM ON: CPT | Performed by: OPHTHALMOLOGY

## 2022-12-14 PROCEDURE — 99214 OFFICE O/P EST MOD 30 MIN: CPT | Performed by: OPHTHALMOLOGY

## 2022-12-14 PROCEDURE — G0463 HOSPITAL OUTPT CLINIC VISIT: HCPCS | Mod: 25

## 2022-12-14 PROCEDURE — 92083 EXTENDED VISUAL FIELD XM: CPT | Performed by: OPHTHALMOLOGY

## 2022-12-14 ASSESSMENT — CONF VISUAL FIELD
OS_NORMAL: 1
OD_SUPERIOR_NASAL_RESTRICTION: 0
OS_SUPERIOR_NASAL_RESTRICTION: 0
OD_NORMAL: 1
OS_INFERIOR_TEMPORAL_RESTRICTION: 0
OD_SUPERIOR_TEMPORAL_RESTRICTION: 0
OS_INFERIOR_NASAL_RESTRICTION: 0
OD_INFERIOR_NASAL_RESTRICTION: 0
OD_INFERIOR_TEMPORAL_RESTRICTION: 0
OS_SUPERIOR_TEMPORAL_RESTRICTION: 0
METHOD: COUNTING FINGERS

## 2022-12-14 ASSESSMENT — REFRACTION_WEARINGRX
OD_CYLINDER: +0.50
OD_AXIS: 133
OS_SPHERE: +3.50
OD_SPHERE: +2.25
OS_CYLINDER: +0.75
OS_AXIS: 006
OS_ADD: +2.00
OD_ADD: +2.00

## 2022-12-14 ASSESSMENT — TONOMETRY
OD_IOP_MMHG: 14
OS_IOP_MMHG: 17
IOP_METHOD: ICARE

## 2022-12-14 ASSESSMENT — EXTERNAL EXAM - RIGHT EYE: OD_EXAM: NORMAL

## 2022-12-14 ASSESSMENT — VISUAL ACUITY
OD_CC: 20/20
METHOD: SNELLEN - LINEAR
CORRECTION_TYPE: GLASSES
OD_CC+: -1
OS_CC+: -2
OS_CC: 20/20

## 2022-12-14 ASSESSMENT — CUP TO DISC RATIO
OD_RATIO: 0.3
OS_RATIO: 0.3

## 2022-12-14 ASSESSMENT — EXTERNAL EXAM - LEFT EYE: OS_EXAM: NORMAL

## 2022-12-14 NOTE — NURSING NOTE
Chief Complaints and History of Present Illnesses   Patient presents with     Glaucoma Follow-Up     Chief Complaint(s) and History of Present Illness(es)     Glaucoma Follow-Up            Laterality: both eyes    Associated symptoms: Negative for eye pain, flashes and floaters    Compliance with Treatment: always          Comments    Here for narrow angle glaucoma both eyes. Vision is about the same. Compliant with drops. No eye pain. No flashes and floaters.    Mt LOPEZ 8:33 AM December 14, 2022

## 2022-12-14 NOTE — PROGRESS NOTES
Chief Complaint(s) and History of Present Illness(es)     Glaucoma Follow-Up            Laterality: both eyes    Associated symptoms: Negative for eye pain, flashes and floaters    Compliance with Treatment: always          Comments    Here for narrow angle glaucoma both eyes. Vision is about the same.   Compliant with drops. No eye pain. No flashes and floaters.    Mt Elizabeth COT 8:33 AM December 14, 2022                Review of systems for the eyes was negative other than the pertinent positives/negatives listed in the HPI.      Assessment & Plan      Jaun Reilly is a 57 year old male with the following diagnoses:   1. Ciliary body cyst, bilateral    2. Anatomical narrow angle borderline glaucoma of both eyes    3. Dry eyes, bilateral         Here for intraocular pressure recheck  S/P Laser peripheral iridotomy (LPI) left eye 12/6/18 and 12/21/18 (both patent)  S/P LPI right eye 12/21/18 (patent)     Maximum intraocular pressure 42/31  Goal 21 or less both eyes   Intraocular pressure remains good in both eyes today   Last dilated fundus exam 2/2022  Visual field with likely lid artifact left eye today   OCT remains within normal limits and stable both eyes                  - Cont latanoprost at bedtime both eyes               - Cont dorzolamide twice a day both eyes   - Artificial tears and warm compresses as needed        Scheduled to see [facility] for recheck of CB cysts June 2023  Can coordinate my visit same day      Patient disposition:   Return in about 6 months (around 6/14/2023) for Gonio with physician prior to dilation, OCT NFL.           Attending Physician Attestation:  Complete documentation of historical and exam elements from today's encounter can be found in the full encounter summary report (not reduplicated in this progress note).  I personally obtained the chief complaint(s) and history of present illness.  I confirmed and edited as necessary the review of systems, past medical/surgical history,  family history, social history, and examination findings as documented by others; and I examined the patient myself.  I personally reviewed the relevant tests, images, and reports as documented above.  I formulated and edited as necessary the assessment and plan and discussed the findings and management plan with the patient and family. . - Job Pinedo MD

## 2023-02-18 ENCOUNTER — HEALTH MAINTENANCE LETTER (OUTPATIENT)
Age: 58
End: 2023-02-18

## 2023-06-08 ENCOUNTER — OFFICE VISIT (OUTPATIENT)
Dept: OPHTHALMOLOGY | Facility: CLINIC | Age: 58
End: 2023-06-08
Attending: OPHTHALMOLOGY
Payer: COMMERCIAL

## 2023-06-08 DIAGNOSIS — H04.123 DRY EYES, BILATERAL: ICD-10-CM

## 2023-06-08 DIAGNOSIS — H21.303: Primary | ICD-10-CM

## 2023-06-08 PROCEDURE — 76513 OPH US DX ANT SGM US UNI/BI: CPT | Performed by: OPHTHALMOLOGY

## 2023-06-08 PROCEDURE — G0463 HOSPITAL OUTPT CLINIC VISIT: HCPCS | Performed by: OPHTHALMOLOGY

## 2023-06-08 PROCEDURE — 99214 OFFICE O/P EST MOD 30 MIN: CPT | Performed by: OPHTHALMOLOGY

## 2023-06-08 ASSESSMENT — VISUAL ACUITY
CORRECTION_TYPE: GLASSES
OS_CC: 20/25
METHOD: SNELLEN - LINEAR
OD_PH_CC: 20/25
OD_CC: 20/30

## 2023-06-08 ASSESSMENT — EXTERNAL EXAM - RIGHT EYE: OD_EXAM: NORMAL

## 2023-06-08 ASSESSMENT — CONF VISUAL FIELD
OD_INFERIOR_NASAL_RESTRICTION: 0
OD_SUPERIOR_NASAL_RESTRICTION: 0
OD_SUPERIOR_TEMPORAL_RESTRICTION: 0
OD_NORMAL: 1
OS_INFERIOR_TEMPORAL_RESTRICTION: 0
OD_INFERIOR_TEMPORAL_RESTRICTION: 0
OS_SUPERIOR_NASAL_RESTRICTION: 0
OS_INFERIOR_NASAL_RESTRICTION: 0
OS_NORMAL: 1
METHOD: COUNTING FINGERS
OS_SUPERIOR_TEMPORAL_RESTRICTION: 0

## 2023-06-08 ASSESSMENT — CUP TO DISC RATIO
OD_RATIO: 0.3
OS_RATIO: 0.3

## 2023-06-08 ASSESSMENT — REFRACTION_WEARINGRX
OS_CYLINDER: +0.75
OD_CYLINDER: +0.50
OD_ADD: +2.00
OD_SPHERE: +2.25
OS_AXIS: 006
OS_ADD: +2.00
OD_AXIS: 133
OS_SPHERE: +3.50

## 2023-06-08 ASSESSMENT — TONOMETRY
OD_IOP_MMHG: 15
IOP_METHOD: ICARE
OS_IOP_MMHG: 15

## 2023-06-08 ASSESSMENT — EXTERNAL EXAM - LEFT EYE: OS_EXAM: NORMAL

## 2023-06-08 NOTE — LETTER
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    Thank you for referring your patient, Jaun Reilly, to the [facility] [place] at [facility]. Please see a copy of my visit note below.    CC: Conj Cyst each eye.    Referring Provider: Rochelle Goss      HPI:    Jaun Reilly 57 year old White male referred for evaluation of right ciliary body cysts.  He denies any pain or vision problems in this eye.  Denies any trauma to the right eye.      POHx:  Last eye exam: 10/25/21 with Dr. Goss  Prior eye surgery/laser:   Laser peripheral iridotomies (2018)    CTL wearer: yes (seldom)    Glasses: yes    Family Hx of eye disease: Possible glaucoma (both parents, pt unsure)    Gtts Currenly Taking:  Dorzolamide BID each eye   Latanoprost at bedtime each eye   PFATs 3-4 times daily each eye     Allergies:  NKDA    Assessment:    # Ciliary body cysts each eye. - right eye cysts smaller. left eye cysts : none at 3 OC and new at 6 OC (small).  - doing well.  - looks same as last visit. Disc with pt.    PLAN:  Imaging of cysts in each eye.   Possible relation to Descovy? [facility] report on renal cysts.    - Disc narrow A/C (possibly due to phacomorphic changes).       Follow up Cornea:  12 months, w/ VT, UBM OU  - call sooner with any problems.      ALSO SEES:  Rochelle Pedraza.     yoli Pierre MD    Attending Physician Attestation:  Complete documentation of historical and exam elements from today's encounter can be found in the full encounter summary report (not reduplicated in this progress note).  I personally obtained the chief complaint(s) and history of present illness.  I confirmed and edited as necessary the review of systems, past medical/surgical history, family history, social history, and examination findings as  documented by others; and I examined the patient myself.  I personally reviewed the relevant tests, images, and reports as documented above.  I formulated and edited as necessary the assessment and plan and discussed the findings and management plan with the patient and family. - Rj Pierre MD       Again, thank you for allowing me to participate in the care of your patient.      Sincerely,    Rj Pierre MD

## 2023-06-08 NOTE — PROGRESS NOTES
CC: Conj Cyst each eye.    Referring Provider: Rochelle Goss      HPI:    Jaun Reilly 57 year old White male referred for evaluation of right ciliary body cysts.  He denies any pain or vision problems in this eye.  Denies any trauma to the right eye.      POHx:  Last eye exam: 10/25/21 with Dr. Goss  Prior eye surgery/laser:   Laser peripheral iridotomies (2018)    CTL wearer: yes (seldom)    Glasses: yes    Family Hx of eye disease: Possible glaucoma (both parents, pt unsure)    Gtts Currenly Taking:  Dorzolamide BID each eye   Latanoprost at bedtime each eye   PFATs 3-4 times daily each eye     Allergies:  NKDA    Assessment:    # Ciliary body cysts each eye. - right eye cysts smaller. left eye cysts : none at 3 OC and new at 6 OC (small).  - doing well.  - looks same as last visit. Disc with pt.    PLAN:  Imaging of cysts in each eye.   Possible relation to Descovy? [facility] report on renal cysts.    - Disc narrow A/C (possibly due to phacomorphic changes).       Follow up Cornea:  12 months, w/ VT, UBM OU  - call sooner with any problems.      ALSO SEES:  Rochelle Pedraza.     rj Pierre MD    Attending Physician Attestation:  Complete documentation of historical and exam elements from today's encounter can be found in the full encounter summary report (not reduplicated in this progress note).  I personally obtained the chief complaint(s) and history of present illness.  I confirmed and edited as necessary the review of systems, past medical/surgical history, family history, social history, and examination findings as documented by others; and I examined the patient myself.  I personally reviewed the relevant tests, images, and reports as documented above.  I formulated and edited as necessary the assessment and plan and discussed the findings and management plan with the patient and family. - Rj Pierre MD

## 2023-06-08 NOTE — NURSING NOTE
Chief Complaints and History of Present Illnesses   Patient presents with     Follow Up     Ciliary body cyst both eyes     Chief Complaint(s) and History of Present Illness(es)     Follow Up            Comments: Ciliary body cyst both eyes          Comments    Pt states vision is the same as last visit. No eye pain today. No new flashes or floaters.  Dryness in both eyes, relief with drops.  Pt prediabetic, does not check blood sugars.  No results found for: A1C    RUPERTO Jones June 8, 2023 7:42 AM

## 2023-06-13 DIAGNOSIS — H40.033 ANATOMICAL NARROW ANGLE BORDERLINE GLAUCOMA OF BOTH EYES: Primary | ICD-10-CM

## 2023-06-14 ENCOUNTER — OFFICE VISIT (OUTPATIENT)
Dept: OPHTHALMOLOGY | Facility: CLINIC | Age: 58
End: 2023-06-14
Attending: OPHTHALMOLOGY
Payer: COMMERCIAL

## 2023-06-14 DIAGNOSIS — H40.033 ANATOMICAL NARROW ANGLE BORDERLINE GLAUCOMA OF BOTH EYES: Primary | ICD-10-CM

## 2023-06-14 DIAGNOSIS — H04.123 DRY EYES, BILATERAL: ICD-10-CM

## 2023-06-14 DIAGNOSIS — H21.303: ICD-10-CM

## 2023-06-14 PROCEDURE — G0463 HOSPITAL OUTPT CLINIC VISIT: HCPCS | Performed by: OPHTHALMOLOGY

## 2023-06-14 PROCEDURE — 92133 CPTRZD OPH DX IMG PST SGM ON: CPT | Performed by: OPHTHALMOLOGY

## 2023-06-14 PROCEDURE — 92014 COMPRE OPH EXAM EST PT 1/>: CPT | Performed by: OPHTHALMOLOGY

## 2023-06-14 RX ORDER — LATANOPROST 50 UG/ML
1 SOLUTION/ DROPS OPHTHALMIC AT BEDTIME
Qty: 2.5 ML | Refills: 11 | Status: SHIPPED | OUTPATIENT
Start: 2023-06-14

## 2023-06-14 RX ORDER — DORZOLAMIDE HCL 20 MG/ML
1 SOLUTION/ DROPS OPHTHALMIC
Qty: 10 ML | Refills: 11 | Status: SHIPPED | OUTPATIENT
Start: 2023-06-14

## 2023-06-14 ASSESSMENT — GONIOSCOPY
OD_SUPERIOR: APPOSITION
OS_TEMPORAL: TM
OS_SUPERIOR: APPOSITION
OS_NASAL: APPOSITION
OD_TEMPORAL: TM
OD_NASAL: APPOSITION

## 2023-06-14 ASSESSMENT — REFRACTION_WEARINGRX
OD_CYLINDER: +0.50
OD_ADD: +2.00
OS_ADD: +2.00
OD_SPHERE: +2.25
OD_AXIS: 133
OS_AXIS: 006
OS_CYLINDER: +0.75
OS_SPHERE: +3.50

## 2023-06-14 ASSESSMENT — CONF VISUAL FIELD
OS_INFERIOR_NASAL_RESTRICTION: 0
OS_NORMAL: 1
OS_SUPERIOR_TEMPORAL_RESTRICTION: 0
OS_SUPERIOR_NASAL_RESTRICTION: 0
OD_INFERIOR_TEMPORAL_RESTRICTION: 0
OD_SUPERIOR_NASAL_RESTRICTION: 0
OS_INFERIOR_TEMPORAL_RESTRICTION: 0
OD_SUPERIOR_TEMPORAL_RESTRICTION: 0
OD_INFERIOR_NASAL_RESTRICTION: 0
OD_NORMAL: 1

## 2023-06-14 ASSESSMENT — VISUAL ACUITY
CORRECTION_TYPE: GLASSES
OD_CC+: -1
METHOD: SNELLEN - LINEAR
OS_CC+: -2
OD_CC: 20/30
OS_CC: 20/20
OD_PH_CC: 20/25

## 2023-06-14 ASSESSMENT — TONOMETRY
OS_IOP_MMHG: 16
IOP_METHOD: TONOPEN
OD_IOP_MMHG: 14

## 2023-06-14 ASSESSMENT — EXTERNAL EXAM - RIGHT EYE: OD_EXAM: NORMAL

## 2023-06-14 ASSESSMENT — CUP TO DISC RATIO
OD_RATIO: 0.3
OS_RATIO: 0.3

## 2023-06-14 ASSESSMENT — EXTERNAL EXAM - LEFT EYE: OS_EXAM: NORMAL

## 2023-06-14 NOTE — PATIENT INSTRUCTIONS
[email or web address]    Dr. Marks and I trained together at [facility] and she is fellowship trained in Glaucoma.  Her office locations are about 2.5 hours from [place]    Their group also has a provider in [place] that you could consider (Roshan Kaiser MD)

## 2023-06-14 NOTE — PROGRESS NOTES
Chief Complaint(s) and History of Present Illness(es)     Glaucoma Follow-Up            Laterality: both eyes          Comments    Pt. States that he is doing well. No pain BE. Some dryness BE. No flashes   or floaters BE.   Shelby Muro COT 8:03 AM June 14, 2023           Review of systems for the eyes was negative other than the pertinent positives/negatives listed in the HPI.      Assessment & Plan      Jaun Reilly is a 57 year old male with the following diagnoses:   1. Anatomical narrow angle borderline glaucoma of both eyes    2. Dry eyes, bilateral    3. Ciliary body cyst, bilateral           Here for intraocular pressure recheck  S/P Laser peripheral iridotomy (LPI) left eye 12/6/18 and 12/21/18 (both patent)  S/P LPI right eye 12/21/18 (patent)  Seen by Dr. Pierre for CB cysts last week - stable and monitoring  Gonio and dilated fundus exam stable today  OCT Nerve fiber layer continues to be within normal limits and stable both eyes   Maximum intraocular pressure 42/31  Goal 21 or less both eyes   Intraocular pressure remains good in both eyes today                  - Cont latanoprost at bedtime both eyes               - Cont dorzolamide twice a day both eyes              - Artificial tears and warm compresses as needed         Moving to [place], [place] in November.  Offered referral to Dr. Bruno for continued cares.  Information given   Recommend continued Q6mo evaluations with annual dilated fundus exam   Return precautions reviewed          Attending Physician Attestation:  Complete documentation of historical and exam elements from today's encounter can be found in the full encounter summary report (not reduplicated in this progress note).  I personally obtained the chief complaint(s) and history of present illness.  I confirmed and edited as necessary the review of systems, past medical/surgical history, family history, social history, and examination findings as documented by others; and I  examined the patient myself.  I personally reviewed the relevant tests, images, and reports as documented above.  I formulated and edited as necessary the assessment and plan and discussed the findings and management plan with the patient and family. . - Job Pinedo MD

## 2023-06-14 NOTE — NURSING NOTE
Chief Complaints and History of Present Illnesses   Patient presents with     Glaucoma Follow-Up     Chief Complaint(s) and History of Present Illness(es)     Glaucoma Follow-Up            Laterality: both eyes          Comments    Pt. States that he is doing well. No pain BE. Some dryness BE. No flashes or floaters BE.   Shelby LOPEZ 8:03 AM June 14, 2023

## 2023-09-25 ENCOUNTER — TELEPHONE (OUTPATIENT)
Dept: OPHTHALMOLOGY | Facility: CLINIC | Age: 58
End: 2023-09-25
Payer: COMMERCIAL

## 2023-09-25 NOTE — TELEPHONE ENCOUNTER
[facility] Call Center    Phone Message    May a detailed message be left on voicemail: yes     Reason for Call: Other: the patient would like to get their eye pressures checked ASAP.  Dr. Pinedo's next available was November.  Please review and follow up with the pt for scheduling.     Action Taken: Message routed to:  [facility]    Travel Screening: Not Applicable

## 2023-09-26 NOTE — TELEPHONE ENCOUNTER
Spoke to pt at 0814    Pt states 3 day history of headache and dizziness.    Today no symptoms per pt.    Pt requesting eye pressure check only.    Offered eye exam in acute eye clinic for symptoms and check eye pressure-- pt declining appt and was finished with conversation/said good bye.    Jluis Ken RN 8:16 AM 09/26/23

## 2023-11-27 ENCOUNTER — TELEPHONE (OUTPATIENT)
Dept: OPHTHALMOLOGY | Facility: CLINIC | Age: 58
End: 2023-11-27
Payer: COMMERCIAL

## 2023-11-27 NOTE — TELEPHONE ENCOUNTER
[facility] Call Center    Phone Message    May a detailed message be left on voicemail: yes     Reason for Call: Medication Question or concern regarding medication   Prescription Clarification  Name of Medication: dorzolamide (TRUSOPT) 2 % ophthalmic solution and latanoprost (XALATAN) 0.005 % ophthalmic solution  Prescribing Provider: Dr. Pinedo   Pharmacy: [id number] - [place], [place] - [address]   What on the order needs clarification? Pt is moving to [place] on Thursday and he is wondering if Dr. Pinedo will still be able to prescribe these eyedrops, or will he need to find a new eye doctor in order to continue getting them prescribed? Please call pt to discuss. Thank you.      Action Taken: Message routed to:  [facility]YE    Travel Screening: Not Applicable

## 2023-11-27 NOTE — TELEPHONE ENCOUNTER
Left detailed message that Dr. Pinedo will not be able to continue prescribing his medication when he moves.  Per last visit, Dr. Pinedo had offered a referral to Dr. Bruno for continued care  and recommended Q6 moth evaluations with annual dilated exams.        Clarissa Pink, JESSICA 2:57 PM 11/27/2023

## 2024-03-14 ENCOUNTER — TELEPHONE (OUTPATIENT)
Dept: OPHTHALMOLOGY | Facility: CLINIC | Age: 59
End: 2024-03-14
Payer: COMMERCIAL

## 2024-03-14 NOTE — TELEPHONE ENCOUNTER
Moving to [place], [place] in November.  Offered referral to Dr. Bruno for continued cares.  Information given   Recommend continued Q6mo evaluations with annual dilated fundus exam   Return precautions reviewed     --    Above per last note in June 2023 per Dr. Pinedo.    No contraindication to dilating at future visits per note.    Left message with information and no contraindication for dilation per [facility] per note from June 2023    Jluis Ken RN 10:43 AM 03/14/24

## 2024-03-14 NOTE — TELEPHONE ENCOUNTER
[facility] Call Center    Phone Message    May a detailed message be left on voicemail: yes     Reason for Call: Other: Pt is requesting a call back from someone on 's team. States he is going to a new clinic in [place] and they'll like to dilate his eye's.      Pt would like to know 's opinion on him getting his eyes dilated. States he is not sure if it's safe for him to be dilated and would like some advice.    Please reach out to pt for further information as well as advise.    Thank You!    Action Taken: Message routed to:  [facility]    Travel Screening: Not Applicable

## 2024-05-09 ENCOUNTER — TELEPHONE (OUTPATIENT)
Dept: OPHTHALMOLOGY | Facility: CLINIC | Age: 59
End: 2024-05-09
Payer: COMMERCIAL

## 2024-05-25 ENCOUNTER — HEALTH MAINTENANCE LETTER (OUTPATIENT)
Age: 59
End: 2024-05-25

## 2024-05-29 ENCOUNTER — TELEPHONE (OUTPATIENT)
Dept: OPHTHALMOLOGY | Facility: CLINIC | Age: 59
End: 2024-05-29

## 2024-05-29 NOTE — TELEPHONE ENCOUNTER
[facility] Call Center    Phone Message    May a detailed message be left on voicemail: yes     Reason for Call: Other: Jaun called requesting a call back from care team. He moved out to [place] but is coming to [place] in July. He would like to see Dr. Pinedo but does not have health insurance and can't pay for what he would normally be seen for (testing etc) He is wondering if he can come in for just an general eye check without any additional testing etc. He is trying to figure out what he can afford. Patient was provided with the cost of care line phone number. Patient still wanted message sent to care team. Please call to advise.      Action Taken: Other: eye    Travel Screening: Not Applicable

## 2025-06-14 ENCOUNTER — HEALTH MAINTENANCE LETTER (OUTPATIENT)
Age: 60
End: 2025-06-14